# Patient Record
Sex: FEMALE | Race: WHITE | Employment: UNEMPLOYED
[De-identification: names, ages, dates, MRNs, and addresses within clinical notes are randomized per-mention and may not be internally consistent; named-entity substitution may affect disease eponyms.]

---

## 2017-01-31 ENCOUNTER — OFFICE VISIT (OUTPATIENT)
Dept: OBGYN | Facility: CLINIC | Age: 72
End: 2017-01-31

## 2017-01-31 VITALS
RESPIRATION RATE: 18 BRPM | HEIGHT: 64 IN | HEART RATE: 78 BPM | SYSTOLIC BLOOD PRESSURE: 118 MMHG | BODY MASS INDEX: 26.8 KG/M2 | DIASTOLIC BLOOD PRESSURE: 70 MMHG | WEIGHT: 157 LBS

## 2017-01-31 DIAGNOSIS — Z01.419 ENCOUNTER FOR GYNECOLOGICAL EXAMINATION (GENERAL) (ROUTINE) WITHOUT ABNORMAL FINDINGS: Primary | ICD-10-CM

## 2017-01-31 PROCEDURE — 99397 PER PM REEVAL EST PAT 65+ YR: CPT | Performed by: ADVANCED PRACTICE MIDWIFE

## 2017-01-31 ASSESSMENT — PATIENT HEALTH QUESTIONNAIRE - PHQ9
SUM OF ALL RESPONSES TO PHQ9 QUESTIONS 1 & 2: 0
1. LITTLE INTEREST OR PLEASURE IN DOING THINGS: 0
2. FEELING DOWN, DEPRESSED OR HOPELESS: 0
SUM OF ALL RESPONSES TO PHQ QUESTIONS 1-9: 0

## 2017-02-06 ENCOUNTER — OFFICE VISIT (OUTPATIENT)
Dept: INTERNAL MEDICINE | Facility: CLINIC | Age: 72
End: 2017-02-06

## 2017-02-06 VITALS
HEIGHT: 64 IN | DIASTOLIC BLOOD PRESSURE: 70 MMHG | BODY MASS INDEX: 26.8 KG/M2 | WEIGHT: 157 LBS | SYSTOLIC BLOOD PRESSURE: 120 MMHG

## 2017-02-06 DIAGNOSIS — M94.9 DISORDER OF CARTILAGE: ICD-10-CM

## 2017-02-06 DIAGNOSIS — E11.69 TYPE 2 DIABETES MELLITUS WITH OTHER SPECIFIED COMPLICATION (HCC): Primary | ICD-10-CM

## 2017-02-06 DIAGNOSIS — M19.91 PRIMARY OSTEOARTHRITIS, UNSPECIFIED SITE: ICD-10-CM

## 2017-02-06 DIAGNOSIS — J44.1 CHRONIC OBSTRUCTIVE PULMONARY DISEASE WITH ACUTE EXACERBATION (HCC): ICD-10-CM

## 2017-02-06 DIAGNOSIS — I10 ESSENTIAL HYPERTENSION: ICD-10-CM

## 2017-02-06 DIAGNOSIS — K21.9 GASTROESOPHAGEAL REFLUX DISEASE WITHOUT ESOPHAGITIS: ICD-10-CM

## 2017-02-06 PROCEDURE — 99214 OFFICE O/P EST MOD 30 MIN: CPT | Performed by: INTERNAL MEDICINE

## 2017-02-06 RX ORDER — OMEPRAZOLE 20 MG/1
20 CAPSULE, DELAYED RELEASE ORAL DAILY
Qty: 30 CAPSULE | Refills: 3 | Status: SHIPPED | OUTPATIENT
Start: 2017-02-06 | End: 2017-06-08 | Stop reason: SDUPTHER

## 2017-02-06 ASSESSMENT — ENCOUNTER SYMPTOMS
SHORTNESS OF BREATH: 1
BLOOD IN STOOL: 0
RHINORRHEA: 0
EYE ITCHING: 0
VOMITING: 0
SORE THROAT: 0
SINUS PRESSURE: 0
RECTAL PAIN: 0
PHOTOPHOBIA: 0
APNEA: 0
COLOR CHANGE: 0
EYE REDNESS: 0
CONSTIPATION: 0
BACK PAIN: 0
VOICE CHANGE: 0
TROUBLE SWALLOWING: 0
FACIAL SWELLING: 0
NAUSEA: 0
COUGH: 0
DIARRHEA: 0
ANAL BLEEDING: 0
CHEST TIGHTNESS: 0
CHOKING: 0
STRIDOR: 0
ABDOMINAL PAIN: 0
ABDOMINAL DISTENTION: 0
EYE PAIN: 0
WHEEZING: 0
EYE DISCHARGE: 0

## 2017-02-07 ENCOUNTER — TELEPHONE (OUTPATIENT)
Dept: INTERNAL MEDICINE | Facility: CLINIC | Age: 72
End: 2017-02-07

## 2017-02-07 RX ORDER — MULTIVIT-MIN/IRON/FOLIC ACID/K 18-600-40
2000 CAPSULE ORAL DAILY
COMMUNITY
End: 2020-03-11

## 2017-02-08 LAB
BASOPHILS ABSOLUTE: 0 /ΜL
BASOPHILS RELATIVE PERCENT: 0.5 %
CHOLESTEROL, TOTAL: 224 MG/DL
CHOLESTEROL/HDL RATIO: 3.4
CREATININE, URINE: 5.3
EOSINOPHILS ABSOLUTE: 0.1 /ΜL
EOSINOPHILS RELATIVE PERCENT: 2.4 %
HBA1C MFR BLD: 6.3 %
HCT VFR BLD CALC: 39.6 % (ref 36–46)
HDLC SERPL-MCNC: 66 MG/DL (ref 35–70)
HEMOGLOBIN: 13.2 G/DL (ref 12–16)
LDL CHOLESTEROL CALCULATED: 135 MG/DL (ref 0–160)
LYMPHOCYTES ABSOLUTE: 1.3 /ΜL
LYMPHOCYTES RELATIVE PERCENT: 29.6 %
MCH RBC QN AUTO: 29.2 PG
MCHC RBC AUTO-ENTMCNC: 33.4 G/DL
MCV RBC AUTO: 88 FL
MICROALBUMIN/CREAT 24H UR: 0.8 MG/G{CREAT}
MICROALBUMIN/CREAT UR-RTO: 149.74
MONOCYTES ABSOLUTE: 0.4 /ΜL
MONOCYTES RELATIVE PERCENT: 8.9 %
NEUTROPHILS ABSOLUTE: 2.6 /ΜL
NEUTROPHILS RELATIVE PERCENT: 58.6 %
PDW BLD-RTO: 14.1 %
PLATELET # BLD: 280 K/ΜL
PMV BLD AUTO: 7.9 FL
RBC # BLD: 4.53 10^6/ΜL
TRIGL SERPL-MCNC: 117 MG/DL
VITAMIN D 25-HYDROXY: 30.9
VITAMIN D2, 25 HYDROXY: NORMAL
VITAMIN D3,25 HYDROXY: NORMAL
VLDLC SERPL CALC-MCNC: 23 MG/DL
WBC # BLD: 4.5 10^3/ML

## 2017-02-13 ENCOUNTER — TELEPHONE (OUTPATIENT)
Dept: INTERNAL MEDICINE | Facility: CLINIC | Age: 72
End: 2017-02-13

## 2017-02-13 DIAGNOSIS — M94.9 DISORDER OF CARTILAGE: ICD-10-CM

## 2017-02-13 DIAGNOSIS — E11.69 TYPE 2 DIABETES MELLITUS WITH OTHER SPECIFIED COMPLICATION (HCC): ICD-10-CM

## 2017-02-13 DIAGNOSIS — R30.0 DYSURIA: ICD-10-CM

## 2017-02-13 DIAGNOSIS — N90.7 CYST, VULVA: ICD-10-CM

## 2017-02-13 DIAGNOSIS — M19.91 PRIMARY OSTEOARTHRITIS, UNSPECIFIED SITE: ICD-10-CM

## 2017-02-20 ENCOUNTER — TELEPHONE (OUTPATIENT)
Dept: INTERNAL MEDICINE | Facility: CLINIC | Age: 72
End: 2017-02-20

## 2017-03-22 ENCOUNTER — OFFICE VISIT (OUTPATIENT)
Dept: INTERNAL MEDICINE CLINIC | Age: 72
End: 2017-03-22
Payer: COMMERCIAL

## 2017-03-22 VITALS
SYSTOLIC BLOOD PRESSURE: 130 MMHG | BODY MASS INDEX: 26.63 KG/M2 | DIASTOLIC BLOOD PRESSURE: 72 MMHG | WEIGHT: 156 LBS | HEIGHT: 64 IN

## 2017-03-22 DIAGNOSIS — E11.43 TYPE 2 DIABETES MELLITUS WITH DIABETIC AUTONOMIC NEUROPATHY, WITHOUT LONG-TERM CURRENT USE OF INSULIN (HCC): ICD-10-CM

## 2017-03-22 DIAGNOSIS — M54.42 ACUTE MIDLINE LOW BACK PAIN WITH LEFT-SIDED SCIATICA: Primary | ICD-10-CM

## 2017-03-22 DIAGNOSIS — I10 ESSENTIAL HYPERTENSION: ICD-10-CM

## 2017-03-22 PROCEDURE — 99213 OFFICE O/P EST LOW 20 MIN: CPT | Performed by: INTERNAL MEDICINE

## 2017-03-22 RX ORDER — CYCLOBENZAPRINE HCL 5 MG
5 TABLET ORAL NIGHTLY PRN
Qty: 10 TABLET | Refills: 0 | Status: SHIPPED | OUTPATIENT
Start: 2017-03-22 | End: 2017-04-01

## 2017-03-22 RX ORDER — IBUPROFEN 400 MG/1
400 TABLET ORAL EVERY 6 HOURS PRN
Qty: 40 TABLET | Refills: 0 | Status: SHIPPED | OUTPATIENT
Start: 2017-03-22 | End: 2017-12-18

## 2017-03-23 ASSESSMENT — ENCOUNTER SYMPTOMS
COLOR CHANGE: 0
BACK PAIN: 1
BLOOD IN STOOL: 0
CHOKING: 0
CHEST TIGHTNESS: 0
ABDOMINAL PAIN: 0
EYE PAIN: 0
COUGH: 0
ANAL BLEEDING: 0
EYE ITCHING: 0
ABDOMINAL DISTENTION: 0
APNEA: 0
EYE DISCHARGE: 0

## 2017-04-03 ENCOUNTER — OFFICE VISIT (OUTPATIENT)
Dept: INTERNAL MEDICINE CLINIC | Age: 72
End: 2017-04-03
Payer: COMMERCIAL

## 2017-04-03 VITALS
WEIGHT: 157 LBS | BODY MASS INDEX: 26.8 KG/M2 | HEIGHT: 64 IN | HEART RATE: 80 BPM | SYSTOLIC BLOOD PRESSURE: 126 MMHG | DIASTOLIC BLOOD PRESSURE: 76 MMHG

## 2017-04-03 DIAGNOSIS — M70.62 TROCHANTERIC BURSITIS, LEFT HIP: Primary | ICD-10-CM

## 2017-04-03 DIAGNOSIS — M54.42 ACUTE LEFT-SIDED LOW BACK PAIN WITH LEFT-SIDED SCIATICA: ICD-10-CM

## 2017-04-03 PROCEDURE — 20600 DRAIN/INJ JOINT/BURSA W/O US: CPT | Performed by: INTERNAL MEDICINE

## 2017-04-03 PROCEDURE — 99214 OFFICE O/P EST MOD 30 MIN: CPT | Performed by: INTERNAL MEDICINE

## 2017-04-03 RX ORDER — METHYLPREDNISOLONE 4 MG/1
TABLET ORAL
Qty: 1 KIT | Refills: 0 | Status: SHIPPED | OUTPATIENT
Start: 2017-04-03 | End: 2017-04-09

## 2017-04-03 RX ORDER — METHYLPREDNISOLONE ACETATE 80 MG/ML
80 INJECTION, SUSPENSION INTRA-ARTICULAR; INTRALESIONAL; INTRAMUSCULAR; SOFT TISSUE ONCE
Status: COMPLETED | OUTPATIENT
Start: 2017-04-03 | End: 2017-04-03

## 2017-04-03 RX ADMIN — METHYLPREDNISOLONE ACETATE 80 MG: 80 INJECTION, SUSPENSION INTRA-ARTICULAR; INTRALESIONAL; INTRAMUSCULAR; SOFT TISSUE at 10:45

## 2017-04-03 ASSESSMENT — ENCOUNTER SYMPTOMS
CHEST TIGHTNESS: 0
ANAL BLEEDING: 0
APNEA: 0
EYE ITCHING: 0
EYE DISCHARGE: 0
BLOOD IN STOOL: 0
BACK PAIN: 1
ABDOMINAL PAIN: 0
CHOKING: 0
COUGH: 0
EYE PAIN: 0
COLOR CHANGE: 0
ABDOMINAL DISTENTION: 0

## 2017-04-04 ENCOUNTER — TELEPHONE (OUTPATIENT)
Dept: INTERNAL MEDICINE CLINIC | Age: 72
End: 2017-04-04

## 2017-04-13 ENCOUNTER — TELEPHONE (OUTPATIENT)
Dept: INTERNAL MEDICINE CLINIC | Age: 72
End: 2017-04-13

## 2017-04-13 ENCOUNTER — OFFICE VISIT (OUTPATIENT)
Dept: INTERNAL MEDICINE CLINIC | Age: 72
End: 2017-04-13
Payer: COMMERCIAL

## 2017-04-13 VITALS
DIASTOLIC BLOOD PRESSURE: 70 MMHG | BODY MASS INDEX: 26.8 KG/M2 | HEIGHT: 64 IN | WEIGHT: 157 LBS | HEART RATE: 90 BPM | SYSTOLIC BLOOD PRESSURE: 128 MMHG

## 2017-04-13 DIAGNOSIS — M89.9 BONE LESION: ICD-10-CM

## 2017-04-13 DIAGNOSIS — M51.06 DISC DISEASE WITH MYELOPATHY, LUMBAR: Primary | ICD-10-CM

## 2017-04-13 DIAGNOSIS — M51.9 LUMBAR DISC DISEASE: Primary | ICD-10-CM

## 2017-04-13 PROCEDURE — 99213 OFFICE O/P EST LOW 20 MIN: CPT | Performed by: INTERNAL MEDICINE

## 2017-04-13 RX ORDER — METHYLPREDNISOLONE 4 MG/1
TABLET ORAL
COMMUNITY
Start: 2017-04-03 | End: 2017-09-15

## 2017-04-13 ASSESSMENT — ENCOUNTER SYMPTOMS
SHORTNESS OF BREATH: 0
BLOOD IN STOOL: 0
APNEA: 0
CHOKING: 0
CHEST TIGHTNESS: 0
EYE ITCHING: 0
COUGH: 0
ABDOMINAL DISTENTION: 0
ABDOMINAL PAIN: 0
EYE DISCHARGE: 0
EYE PAIN: 0
BACK PAIN: 1

## 2017-04-18 ENCOUNTER — OFFICE VISIT (OUTPATIENT)
Dept: ORTHOPEDIC SURGERY | Age: 72
End: 2017-04-18
Payer: COMMERCIAL

## 2017-04-18 VITALS — HEIGHT: 64 IN

## 2017-04-18 DIAGNOSIS — M51.36 DDD (DEGENERATIVE DISC DISEASE), LUMBAR: ICD-10-CM

## 2017-04-18 DIAGNOSIS — M48.061 LUMBAR STENOSIS: ICD-10-CM

## 2017-04-18 DIAGNOSIS — M54.50 CHRONIC MIDLINE LOW BACK PAIN WITHOUT SCIATICA: Primary | ICD-10-CM

## 2017-04-18 DIAGNOSIS — G89.29 CHRONIC MIDLINE LOW BACK PAIN WITHOUT SCIATICA: Primary | ICD-10-CM

## 2017-04-18 PROCEDURE — 99203 OFFICE O/P NEW LOW 30 MIN: CPT | Performed by: ORTHOPAEDIC SURGERY

## 2017-04-25 ASSESSMENT — ENCOUNTER SYMPTOMS: BACK PAIN: 1

## 2017-05-09 ENCOUNTER — OFFICE VISIT (OUTPATIENT)
Dept: ORTHOPEDIC SURGERY | Age: 72
End: 2017-05-09
Payer: COMMERCIAL

## 2017-05-09 DIAGNOSIS — M51.06 DISC DISEASE WITH MYELOPATHY, LUMBAR: ICD-10-CM

## 2017-05-09 DIAGNOSIS — M51.36 DDD (DEGENERATIVE DISC DISEASE), LUMBAR: ICD-10-CM

## 2017-05-09 DIAGNOSIS — M48.061 LUMBAR STENOSIS: Primary | ICD-10-CM

## 2017-05-09 PROCEDURE — 99213 OFFICE O/P EST LOW 20 MIN: CPT | Performed by: ORTHOPAEDIC SURGERY

## 2017-08-28 ENCOUNTER — OFFICE VISIT (OUTPATIENT)
Dept: INTERNAL MEDICINE CLINIC | Age: 72
End: 2017-08-28
Payer: COMMERCIAL

## 2017-08-28 VITALS
BODY MASS INDEX: 26.46 KG/M2 | SYSTOLIC BLOOD PRESSURE: 120 MMHG | WEIGHT: 155 LBS | HEIGHT: 64 IN | DIASTOLIC BLOOD PRESSURE: 70 MMHG

## 2017-08-28 DIAGNOSIS — J44.1 CHRONIC OBSTRUCTIVE PULMONARY DISEASE WITH ACUTE EXACERBATION (HCC): ICD-10-CM

## 2017-08-28 DIAGNOSIS — E11.49 TYPE 2 DIABETES MELLITUS WITH OTHER NEUROLOGIC COMPLICATION, UNSPECIFIED LONG TERM INSULIN USE STATUS: ICD-10-CM

## 2017-08-28 DIAGNOSIS — M48.061 LUMBAR STENOSIS: Primary | ICD-10-CM

## 2017-08-28 DIAGNOSIS — I10 ESSENTIAL HYPERTENSION: ICD-10-CM

## 2017-08-28 PROCEDURE — 99214 OFFICE O/P EST MOD 30 MIN: CPT | Performed by: INTERNAL MEDICINE

## 2017-08-28 RX ORDER — MELOXICAM 15 MG/1
15 TABLET ORAL DAILY
Qty: 30 TABLET | Refills: 3 | Status: SHIPPED | OUTPATIENT
Start: 2017-08-28 | End: 2018-03-20

## 2017-08-28 RX ORDER — METHYLPREDNISOLONE 4 MG/1
TABLET ORAL
Qty: 1 KIT | Refills: 0 | Status: SHIPPED | OUTPATIENT
Start: 2017-08-28 | End: 2017-09-03

## 2017-08-28 RX ORDER — SENNOSIDES 8.6 MG
650 CAPSULE ORAL EVERY 8 HOURS PRN
COMMUNITY
End: 2017-12-18

## 2017-08-28 RX ORDER — TIZANIDINE 4 MG/1
4 TABLET ORAL 3 TIMES DAILY
Qty: 30 TABLET | Refills: 2 | Status: SHIPPED | OUTPATIENT
Start: 2017-08-28 | End: 2017-12-18

## 2017-08-28 ASSESSMENT — ENCOUNTER SYMPTOMS
EYE PAIN: 0
FACIAL SWELLING: 0
SORE THROAT: 0
PHOTOPHOBIA: 0
APNEA: 0
VOICE CHANGE: 0
SHORTNESS OF BREATH: 1
CONSTIPATION: 0
NAUSEA: 0
SINUS PRESSURE: 0
BLOOD IN STOOL: 0
ABDOMINAL DISTENTION: 0
BACK PAIN: 1
ABDOMINAL PAIN: 0
COUGH: 0
VOMITING: 0
CHOKING: 0
STRIDOR: 0
COLOR CHANGE: 0
EYE DISCHARGE: 0
TROUBLE SWALLOWING: 0
EYE REDNESS: 0
CHEST TIGHTNESS: 0
RECTAL PAIN: 0
RHINORRHEA: 0
EYE ITCHING: 0
WHEEZING: 0
DIARRHEA: 0
ANAL BLEEDING: 0

## 2017-09-15 ENCOUNTER — OFFICE VISIT (OUTPATIENT)
Dept: INTERNAL MEDICINE CLINIC | Age: 72
End: 2017-09-15
Payer: COMMERCIAL

## 2017-09-15 VITALS
SYSTOLIC BLOOD PRESSURE: 138 MMHG | DIASTOLIC BLOOD PRESSURE: 80 MMHG | HEIGHT: 64 IN | WEIGHT: 156 LBS | BODY MASS INDEX: 26.63 KG/M2

## 2017-09-15 DIAGNOSIS — E11.43 TYPE 2 DIABETES MELLITUS WITH DIABETIC AUTONOMIC NEUROPATHY, WITHOUT LONG-TERM CURRENT USE OF INSULIN (HCC): ICD-10-CM

## 2017-09-15 DIAGNOSIS — M48.061 LUMBAR STENOSIS: ICD-10-CM

## 2017-09-15 DIAGNOSIS — R55 SYNCOPE, UNSPECIFIED SYNCOPE TYPE: Primary | ICD-10-CM

## 2017-09-15 DIAGNOSIS — E55.9 VITAMIN D DEFICIENCY: ICD-10-CM

## 2017-09-15 DIAGNOSIS — J44.1 CHRONIC OBSTRUCTIVE PULMONARY DISEASE WITH ACUTE EXACERBATION (HCC): ICD-10-CM

## 2017-09-15 DIAGNOSIS — I10 ESSENTIAL HYPERTENSION: ICD-10-CM

## 2017-09-15 PROCEDURE — 99214 OFFICE O/P EST MOD 30 MIN: CPT | Performed by: INTERNAL MEDICINE

## 2017-09-15 ASSESSMENT — ENCOUNTER SYMPTOMS
VOMITING: 0
SORE THROAT: 0
FACIAL SWELLING: 0
CHOKING: 0
VOICE CHANGE: 0
ANAL BLEEDING: 0
RECTAL PAIN: 0
EYE DISCHARGE: 0
PHOTOPHOBIA: 0
EYE ITCHING: 0
RHINORRHEA: 0
TROUBLE SWALLOWING: 0
EYE REDNESS: 0
NAUSEA: 0
ABDOMINAL PAIN: 1
DIARRHEA: 0
CHEST TIGHTNESS: 0
BACK PAIN: 1
SINUS PRESSURE: 0
BLOOD IN STOOL: 0
SHORTNESS OF BREATH: 1
EYE PAIN: 0
COUGH: 0
COLOR CHANGE: 0
CONSTIPATION: 0
STRIDOR: 0
APNEA: 0
WHEEZING: 0
ABDOMINAL DISTENTION: 0

## 2017-09-26 LAB
ALBUMIN SERPL-MCNC: 4.3 G/DL
ALP BLD-CCNC: 61 U/L
ALT SERPL-CCNC: 19 U/L
ANION GAP SERPL CALCULATED.3IONS-SCNC: 8 MMOL/L
AST SERPL-CCNC: 16 U/L
AVERAGE GLUCOSE: 148
BASOPHILS ABSOLUTE: 0 /ΜL
BASOPHILS RELATIVE PERCENT: 0.6 %
BILIRUB SERPL-MCNC: 0.5 MG/DL (ref 0.1–1.4)
BILIRUBIN, URINE: NEGATIVE
BLOOD, URINE: NEGATIVE
BUN BLDV-MCNC: 18 MG/DL
CALCIUM SERPL-MCNC: 9.4 MG/DL
CHLORIDE BLD-SCNC: 104 MMOL/L
CHOLESTEROL, TOTAL: 251 MG/DL
CHOLESTEROL/HDL RATIO: 3.7
CLARITY: CLEAR
CO2: 31 MMOL/L
COLOR: YELLOW
CREAT SERPL-MCNC: 0.84 MG/DL
CREATININE URINE: 202.6 MG/DL
EOSINOPHILS ABSOLUTE: 0.1 /ΜL
EOSINOPHILS RELATIVE PERCENT: 2.1 %
GFR CALCULATED: >60
GLUCOSE BLD-MCNC: 132 MG/DL
GLUCOSE URINE: NEGATIVE
HBA1C MFR BLD: 6.8 %
HCT VFR BLD CALC: 39.7 % (ref 36–46)
HDLC SERPL-MCNC: 67 MG/DL (ref 35–70)
HEMOGLOBIN: 13.5 G/DL (ref 12–16)
KETONES, URINE: NEGATIVE
LDL CHOLESTEROL CALCULATED: 158 MG/DL (ref 0–160)
LEUKOCYTE ESTERASE, URINE: NEGATIVE
LYMPHOCYTES ABSOLUTE: 1 /ΜL
LYMPHOCYTES RELATIVE PERCENT: 21 %
MCH RBC QN AUTO: 29.9 PG
MCHC RBC AUTO-ENTMCNC: 33.9 G/DL
MCV RBC AUTO: 88 FL
MICROALBUMIN/CREAT 24H UR: 1 MG/G{CREAT}
MONOCYTES ABSOLUTE: 0.4 /ΜL
MONOCYTES RELATIVE PERCENT: 9.1 %
NEUTROPHILS ABSOLUTE: 3.3 /ΜL
NEUTROPHILS RELATIVE PERCENT: 67.2 %
NITRITE, URINE: NEGATIVE
PDW BLD-RTO: 14.1 %
PH UA: 6 (ref 4.5–8)
PLATELET # BLD: 258 K/ΜL
PMV BLD AUTO: 7.8 FL
POTASSIUM SERPL-SCNC: 3.6 MMOL/L
PROTEIN UA: NEGATIVE
RBC # BLD: 4.51 10^6/ΜL
SODIUM BLD-SCNC: 143 MMOL/L
SPECIFIC GRAVITY, URINE: 1.02
TOTAL PROTEIN: 7.1
TRIGL SERPL-MCNC: 132 MG/DL
UROBILINOGEN, URINE: NORMAL
VITAMIN D 25-HYDROXY: NORMAL
VITAMIN D2, 25 HYDROXY: NORMAL
VITAMIN D3,25 HYDROXY: 41.1
VLDLC SERPL CALC-MCNC: 26 MG/DL
WBC # BLD: 4.9 10^3/ML

## 2017-10-03 ENCOUNTER — TELEPHONE (OUTPATIENT)
Dept: INTERNAL MEDICINE CLINIC | Age: 72
End: 2017-10-03

## 2017-10-04 DIAGNOSIS — I10 ESSENTIAL HYPERTENSION: ICD-10-CM

## 2017-10-04 DIAGNOSIS — E11.43 TYPE 2 DIABETES MELLITUS WITH DIABETIC AUTONOMIC NEUROPATHY, WITHOUT LONG-TERM CURRENT USE OF INSULIN (HCC): ICD-10-CM

## 2017-10-04 DIAGNOSIS — E55.9 VITAMIN D DEFICIENCY: ICD-10-CM

## 2017-11-28 RX ORDER — OMEPRAZOLE 20 MG/1
CAPSULE, DELAYED RELEASE ORAL
Qty: 30 CAPSULE | Refills: 3 | Status: SHIPPED | OUTPATIENT
Start: 2017-11-28 | End: 2018-06-19 | Stop reason: SDUPTHER

## 2017-12-18 ENCOUNTER — OFFICE VISIT (OUTPATIENT)
Dept: INTERNAL MEDICINE CLINIC | Age: 72
End: 2017-12-18
Payer: COMMERCIAL

## 2017-12-18 VITALS
WEIGHT: 151 LBS | DIASTOLIC BLOOD PRESSURE: 80 MMHG | BODY MASS INDEX: 25.78 KG/M2 | HEIGHT: 64 IN | SYSTOLIC BLOOD PRESSURE: 120 MMHG

## 2017-12-18 DIAGNOSIS — I10 ESSENTIAL HYPERTENSION: Primary | ICD-10-CM

## 2017-12-18 DIAGNOSIS — J44.1 CHRONIC OBSTRUCTIVE PULMONARY DISEASE WITH ACUTE EXACERBATION (HCC): ICD-10-CM

## 2017-12-18 DIAGNOSIS — E11.59 TYPE 2 DIABETES MELLITUS WITH OTHER CIRCULATORY COMPLICATION, UNSPECIFIED LONG TERM INSULIN USE STATUS: ICD-10-CM

## 2017-12-18 DIAGNOSIS — I70.1 RAS (RENAL ARTERY STENOSIS) (HCC): ICD-10-CM

## 2017-12-18 PROCEDURE — 99214 OFFICE O/P EST MOD 30 MIN: CPT | Performed by: INTERNAL MEDICINE

## 2017-12-18 RX ORDER — METHYLPREDNISOLONE 4 MG/1
TABLET ORAL
Qty: 1 KIT | Refills: 0 | Status: SHIPPED | OUTPATIENT
Start: 2017-12-18 | End: 2017-12-24

## 2017-12-18 RX ORDER — LIDOCAINE 50 MG/G
1 PATCH TOPICAL DAILY
Qty: 30 PATCH | Refills: 0 | Status: SHIPPED | OUTPATIENT
Start: 2017-12-18 | End: 2018-05-10

## 2017-12-18 RX ORDER — COVID-19 ANTIGEN TEST
KIT MISCELLANEOUS
COMMUNITY
End: 2017-12-18

## 2017-12-18 RX ORDER — ACETAMINOPHEN AND CODEINE PHOSPHATE 300; 30 MG/1; MG/1
1-2 TABLET ORAL EVERY 6 HOURS PRN
Qty: 50 TABLET | Refills: 1 | Status: SHIPPED | OUTPATIENT
Start: 2017-12-18 | End: 2018-02-05 | Stop reason: SDUPTHER

## 2017-12-18 ASSESSMENT — ENCOUNTER SYMPTOMS
EYE PAIN: 0
EYE ITCHING: 0
BACK PAIN: 1
DIARRHEA: 0
FACIAL SWELLING: 0
CONSTIPATION: 0
STRIDOR: 0
COLOR CHANGE: 0
NAUSEA: 0
SHORTNESS OF BREATH: 1
ABDOMINAL DISTENTION: 0
ANAL BLEEDING: 0
EYE DISCHARGE: 0
VOICE CHANGE: 0
EYE REDNESS: 0
CHOKING: 0
PHOTOPHOBIA: 0
BLOOD IN STOOL: 0
RECTAL PAIN: 0
COUGH: 0
TROUBLE SWALLOWING: 0
APNEA: 0
VOMITING: 0
WHEEZING: 0
SINUS PRESSURE: 0
SORE THROAT: 0
ABDOMINAL PAIN: 0
CHEST TIGHTNESS: 0
RHINORRHEA: 0

## 2017-12-18 NOTE — PROGRESS NOTES
Chronic Disease Visit Information    BP Readings from Last 3 Encounters:   09/15/17 138/80   08/28/17 120/70   04/13/17 128/70          Hemoglobin A1C (%)   Date Value   09/26/2017 6.8   02/08/2017 6.3   09/15/2016 6.3     Microalb/Crt. Ratio (mcg/mg creat)   Date Value   06/24/2014 7     LDL Cholesterol (mg/dL)   Date Value   06/24/2014 80     LDL Calculated (mg/dL)   Date Value   09/26/2017 158     HDL (mg/dL)   Date Value   09/26/2017 67     BUN (mg/dL)   Date Value   09/26/2017 18     CREATININE (no units)   Date Value   09/26/2017 0.84     Glucose (mg/dL)   Date Value   09/26/2017 132            Have you changed or started any medications since your last visit including any over-the-counter medicines, vitamins, or herbal medicines? no   Are you having any side effects from any of your medications? -  no  Have you stopped taking any of your medications? Is so, why? -  no    Have you seen any other physician or provider since your last visit? No  Have you had any other diagnostic tests since your last visit? No  Have you been seen in the emergency room and/or had an admission to a hospital since we last saw you? No  Have you had your annual diabetic retinal (eye) exam? No  Have you had your routine dental cleaning in the past 6 months? no    Have you activated your [employer] account? If not, what are your barriers?  No: declined     Patient Care Team:  Cristela Carrero MD as PCP - General (Internal Medicine)  Jonas Bill DO as Consulting Physician (Obstetrics & Gynecology)         Medical History Review  Past Medical, Family, and Social History reviewed and does contribute to the patient presenting condition  Chief Complaint   Patient presents with    Back Pain     follow up     Diabetes     6.8%    COPD     management      Health Maintenance   Topic Date Due    DTaP/Tdap/Td vaccine (1 - Tdap) 02/17/1963    Diabetic foot exam  09/15/2018    Diabetic hemoglobin A1C test  09/26/2018    Diabetic microalbuminuria test  09/26/2018    Lipid screen  09/26/2018    Diabetic retinal exam  11/28/2018    Breast cancer screen  01/23/2019    Colon cancer screen colonoscopy  04/27/2025    Zostavax vaccine  Addressed    DEXA (modify frequency per FRAX score)  Completed    Flu vaccine  Completed    Pneumococcal low/med risk  Completed

## 2017-12-18 NOTE — PROGRESS NOTES
 Drug use: No    Sexual activity: Not Currently     Partners: Male     Birth control/ protection: Post-menopausal     Other Topics Concern    None     Social History Narrative    None       Current Outpatient Prescriptions   Medication Sig Dispense Refill    Naproxen Sodium (ALEVE) 220 MG CAPS Take by mouth      acetaminophen-codeine (TYLENOL #3) 300-30 MG per tablet Take 1-2 tablets by mouth every 6 hours as needed for Pain . 50 tablet 1    lidocaine (LIDODERM) 5 % Place 1 patch onto the skin daily 12 hours on, 12 hours off. 30 patch 0    methylPREDNISolone (MEDROL, APOLONIA,) 4 MG tablet Take as directed 1 kit 0    omeprazole (PRILOSEC) 20 MG delayed release capsule TAKE 1 CAPSULE BY MOUTH DAILY 30 capsule 3    losartan (COZAAR) 100 MG tablet TAKE 1 TABLET EVERY DAY 90 tablet 3    meloxicam (MOBIC) 15 MG tablet Take 1 tablet by mouth daily 30 tablet 3    amLODIPine (NORVASC) 10 MG tablet TAKE 1 TABLET BY MOUTH DAILY 90 tablet 3    glimepiride (AMARYL) 2 MG tablet TAKE 1 TABLET EVERY MORNING BEFORE BREAKFAST 90 tablet 2    SYMBICORT 160-4.5 MCG/ACT AERO INHALE 2 PUFFS TWICE DAILY 30.6 Inhaler 5    Cholecalciferol (VITAMIN D) 2000 UNITS CAPS capsule Take 2,000 Units by mouth daily      vitamin E 400 UNIT capsule Take 400 Units by mouth daily      methylcellulose (CITRUCEL) 500 MG TABS Take by mouth      butalbital-acetaminophen-caffeine (FIORICET, ESGIC) per tablet       CALCIUM CITRATE PO Take  by mouth daily. No current facility-administered medications for this visit. Review of Systems:    Review of Systems   Constitutional: Negative for activity change, appetite change, chills, diaphoresis, fatigue and fever. HENT: Negative for congestion, dental problem, drooling, ear discharge, ear pain, facial swelling, hearing loss, mouth sores, nosebleeds, postnasal drip, rhinorrhea, sinus pressure, sneezing, sore throat, tinnitus, trouble swallowing and voice change.     Eyes: Negative for photophobia, pain, discharge, redness, itching and visual disturbance. Respiratory: Positive for shortness of breath. Negative for apnea, cough, choking, chest tightness, wheezing and stridor. Cardiovascular: Negative for chest pain, palpitations and leg swelling. Gastrointestinal: Negative for abdominal distention, abdominal pain, anal bleeding, blood in stool, constipation, diarrhea, nausea, rectal pain and vomiting. Endocrine: Negative for cold intolerance, heat intolerance, polydipsia, polyphagia and polyuria. Genitourinary: Negative for decreased urine volume, difficulty urinating, dyspareunia, dysuria, enuresis, flank pain, frequency, genital sores, hematuria, menstrual problem, pelvic pain, urgency, vaginal bleeding and vaginal discharge. Musculoskeletal: Positive for arthralgias and back pain. Negative for gait problem, joint swelling, myalgias, neck pain and neck stiffness. Skin: Negative for color change, pallor, rash and wound. Neurological: Negative for dizziness, tremors, seizures, syncope, facial asymmetry, speech difficulty, weakness, light-headedness, numbness and headaches. Hematological: Negative for adenopathy. Does not bruise/bleed easily. Psychiatric/Behavioral: Positive for sleep disturbance. Negative for agitation, behavioral problems, confusion, decreased concentration and dysphoric mood. The patient is nervous/anxious. Objective:     Physical Exam:      Physical Exam   Constitutional: She is oriented to person, place, and time. She appears well-developed and well-nourished. No distress. HENT:   Head: Normocephalic and atraumatic. Right Ear: External ear normal.   Left Ear: External ear normal.   Nose: Nose normal.   Mouth/Throat: Oropharynx is clear and moist. No oropharyngeal exudate. Eyes: Conjunctivae and EOM are normal. Pupils are equal, round, and reactive to light. Right eye exhibits no discharge. Left eye exhibits no discharge. No scleral icterus. Neck: Normal range of motion. Neck supple. No JVD present. No tracheal deviation present. No thyromegaly present. Cardiovascular: Normal rate, regular rhythm, normal heart sounds and intact distal pulses. Exam reveals no gallop and no friction rub. No murmur heard. Pulmonary/Chest: Effort normal. No respiratory distress. She has decreased breath sounds. She has no wheezes. She has no rales. She exhibits no tenderness. Abdominal: Soft. Bowel sounds are normal. She exhibits no distension and no mass. There is no tenderness. There is no rebound and no guarding. Genitourinary: Rectal exam shows guaiac negative stool. No vaginal discharge found. Musculoskeletal: She exhibits no edema. Lumbar back: She exhibits decreased range of motion, tenderness and pain. Lymphadenopathy:     She has no cervical adenopathy. Neurological: She is alert and oriented to person, place, and time. She has normal reflexes. No cranial nerve deficit. She exhibits normal muscle tone. Coordination normal.   Skin: Skin is warm and dry. No rash noted. She is not diaphoretic. No erythema. No pallor. Psychiatric: Her behavior is normal. Judgment and thought content normal. Her mood appears anxious.          Results for orders placed or performed in visit on 10/04/17   Hemoglobin A1C   Result Value Ref Range    Hemoglobin A1C 6.8 %    AVERAGE GLUCOSE 148    Comprehensive Metabolic Panel   Result Value Ref Range    Sodium 143 mmol/L    Chloride 104 mmol/L    Potassium 3.6 mmol/L    BUN 18 mg/dL    CREATININE 0.84     Glucose 132 mg/dL    AST 16 U/L    ALT 19 U/L    Calcium 9.4 mg/dL    Total Protein 7.1     CO2 31 mmol/L    Alb 4.3     Alkaline Phosphatase 61 U/L    Total Bilirubin 0.5 0.1 - 1.4 mg/dL    Gfr Calculated >60     Anion Gap 8 mmol/L   CBC Auto Differential   Result Value Ref Range    WBC 4.9 10^3/mL    RBC 4.51 10^6/µL    Hemoglobin 13.5 12.0 - 16.0 g/dL    Hematocrit 39.7 36 - 46 %    MCV 88 fL    MCH 29.9 pg Refill:  0    methylPREDNISolone (MEDROL, APOLONIA,) 4 MG tablet     Sig: Take as directed     Dispense:  1 kit     Refill:  0     D/c naproxen    f/u ortho         Lambert Gianlucaslimchandrika received counseling on the following healthy behaviors: medication adherence  Reviewed prior labs and health maintenance. Continue current medications, diet and exercise. Discussed use, benefit, and side effects of prescribed medications. Barriers to medication compliance addressed. Patient given educational materials - see patient instructions. All patient questions answered. Patient voiced understanding. 1.  Patient given educational materials - see patient instructions  2. Discussed use, benefit, and side effects of prescribed medications. Barriers to medication compliance addressed. All patient questions answered. Pt voiced understanding. 3.  Reviewed prior labs and health maintenance  4. Continue current medications, diet and exercise.   5. 4 weeks

## 2018-01-17 ENCOUNTER — TELEPHONE (OUTPATIENT)
Dept: INTERNAL MEDICINE CLINIC | Age: 73
End: 2018-01-17

## 2018-01-17 DIAGNOSIS — Z12.39 ENCOUNTER FOR BREAST CANCER SCREENING OTHER THAN MAMMOGRAM: Primary | ICD-10-CM

## 2018-01-17 NOTE — TELEPHONE ENCOUNTER
Patient due for thom, would like order sent to [facility]. I faxed this to [facility]    Patient also going to have back surgery with Dr Moisés Velasquez.   We will be receiving paperwork

## 2018-01-26 DIAGNOSIS — Z12.39 BREAST CANCER SCREENING, HIGH RISK PATIENT: Primary | ICD-10-CM

## 2018-02-05 ENCOUNTER — OFFICE VISIT (OUTPATIENT)
Dept: INTERNAL MEDICINE CLINIC | Age: 73
End: 2018-02-05
Payer: COMMERCIAL

## 2018-02-05 VITALS
WEIGHT: 147 LBS | HEIGHT: 64 IN | SYSTOLIC BLOOD PRESSURE: 108 MMHG | DIASTOLIC BLOOD PRESSURE: 62 MMHG | HEART RATE: 67 BPM | BODY MASS INDEX: 25.1 KG/M2

## 2018-02-05 DIAGNOSIS — G89.29 CHRONIC BILATERAL LOW BACK PAIN WITH LEFT-SIDED SCIATICA: Primary | ICD-10-CM

## 2018-02-05 DIAGNOSIS — M54.42 CHRONIC BILATERAL LOW BACK PAIN WITH LEFT-SIDED SCIATICA: Primary | ICD-10-CM

## 2018-02-05 DIAGNOSIS — K59.03 DRUG-INDUCED CONSTIPATION: ICD-10-CM

## 2018-02-05 DIAGNOSIS — E11.43 TYPE 2 DIABETES MELLITUS WITH DIABETIC AUTONOMIC NEUROPATHY, WITHOUT LONG-TERM CURRENT USE OF INSULIN (HCC): ICD-10-CM

## 2018-02-05 DIAGNOSIS — I10 ESSENTIAL HYPERTENSION: Primary | ICD-10-CM

## 2018-02-05 DIAGNOSIS — M51.06 DISC DISEASE WITH MYELOPATHY, LUMBAR: ICD-10-CM

## 2018-02-05 DIAGNOSIS — J44.1 CHRONIC OBSTRUCTIVE PULMONARY DISEASE WITH ACUTE EXACERBATION (HCC): ICD-10-CM

## 2018-02-05 PROCEDURE — 99214 OFFICE O/P EST MOD 30 MIN: CPT | Performed by: INTERNAL MEDICINE

## 2018-02-05 ASSESSMENT — ENCOUNTER SYMPTOMS
ABDOMINAL PAIN: 0
EYE REDNESS: 0
PHOTOPHOBIA: 0
EYE ITCHING: 0
VOICE CHANGE: 0
ANAL BLEEDING: 0
EYE PAIN: 0
EYE DISCHARGE: 0
SINUS PRESSURE: 0
CHEST TIGHTNESS: 0
COLOR CHANGE: 0
APNEA: 0
SHORTNESS OF BREATH: 0
WHEEZING: 0
COUGH: 0
CHOKING: 0
CONSTIPATION: 1
VOMITING: 0
DIARRHEA: 0
STRIDOR: 0
NAUSEA: 0
TROUBLE SWALLOWING: 0
ABDOMINAL DISTENTION: 0
RECTAL PAIN: 0
BACK PAIN: 1
SORE THROAT: 0
BLOOD IN STOOL: 0
RHINORRHEA: 0
FACIAL SWELLING: 0

## 2018-02-05 ASSESSMENT — PATIENT HEALTH QUESTIONNAIRE - PHQ9
SUM OF ALL RESPONSES TO PHQ9 QUESTIONS 1 & 2: 2
SUM OF ALL RESPONSES TO PHQ QUESTIONS 1-9: 2
2. FEELING DOWN, DEPRESSED OR HOPELESS: 1
1. LITTLE INTEREST OR PLEASURE IN DOING THINGS: 1

## 2018-02-05 NOTE — PROGRESS NOTES
icterus. Neck: Normal range of motion. Neck supple. No JVD present. No tracheal deviation present. No thyromegaly present. Cardiovascular: Normal rate, regular rhythm, normal heart sounds and intact distal pulses. Exam reveals no gallop and no friction rub. No murmur heard. Pulmonary/Chest: Effort normal and breath sounds normal. No respiratory distress. She has no wheezes. She has no rales. She exhibits no tenderness. Abdominal: Soft. Bowel sounds are normal. She exhibits no distension and no mass. There is tenderness. There is no rebound and no guarding. Genitourinary: Rectal exam shows guaiac negative stool. No vaginal discharge found. Musculoskeletal: She exhibits tenderness. She exhibits no edema. Lymphadenopathy:     She has no cervical adenopathy. Neurological: She is alert and oriented to person, place, and time. She has normal reflexes. No cranial nerve deficit. She exhibits normal muscle tone. Coordination normal.   Skin: Skin is warm and dry. No rash noted. She is not diaphoretic. No erythema. No pallor. Psychiatric: She has a normal mood and affect.  Her behavior is normal. Judgment and thought content normal.         Results for orders placed or performed in visit on 10/04/17   Hemoglobin A1C   Result Value Ref Range    Hemoglobin A1C 6.8 %    AVERAGE GLUCOSE 148    Comprehensive Metabolic Panel   Result Value Ref Range    Sodium 143 mmol/L    Chloride 104 mmol/L    Potassium 3.6 mmol/L    BUN 18 mg/dL    CREATININE 0.84     Glucose 132 mg/dL    AST 16 U/L    ALT 19 U/L    Calcium 9.4 mg/dL    Total Protein 7.1     CO2 31 mmol/L    Alb 4.3     Alkaline Phosphatase 61 U/L    Total Bilirubin 0.5 0.1 - 1.4 mg/dL    Gfr Calculated >60     Anion Gap 8 mmol/L   CBC Auto Differential   Result Value Ref Range    WBC 4.9 10^3/mL    RBC 4.51 10^6/µL    Hemoglobin 13.5 12.0 - 16.0 g/dL    Hematocrit 39.7 36 - 46 %    MCV 88 fL    MCH 29.9 pg    MCHC 33.9 g/dL    Platelets 923 K/µL    RDW 14.1 % MPV 7.8 fL    Neutrophils % 67.2 %    Lymphocytes % 21.0 %    Monocytes % 9.1 %    Eosinophils % 2.1 %    Basophils % 0.6 %    Neutrophils # 3.3 /µL    Lymphocytes # 1.0 /µL    Monocytes # 0.4 /µL    Eosinophils # 0.1 /µL    Basophils # 0.0 /µL   Lipid Panel   Result Value Ref Range    Cholesterol, Total 251 mg/dL    HDL 67 35 - 70 mg/dL    LDL Calculated 158 0 - 160 mg/dL    Triglycerides 132 mg/dL    Chol/HDL Ratio 3.7     VLDL 26 mg/dL   Urinalysis   Result Value Ref Range    pH, UA 6.0 4.5 - 8.0    Clarity, UA Clear Clear    Protein, UA Negative Negative    Glucose, Ur Negative     Ketones, Urine Negative     Bilirubin, Urine Negative     Urobilinogen, Urine Normal     Nitrite, Urine Negative     Specific Gravity, Urine 1.023     Color, UA Yellow     Leukocyte Esterase, Urine Negative     Blood, Urine Negative    Microalbumin, Ur   Result Value Ref Range    Microalb, Ur 1.0     Creatinine, Ur 202.60 mg/dL   Vitamin D 25 Hydroxy   Result Value Ref Range    Vit D, 25-Hydroxy      Vitamin D3,25 Hydroxy 41.1     Vitamin D2, 25 Hydroxy       No results found. Assessment:     1. Essential hypertension   controlled cr nl no cp   2. Chronic obstructive pulmonary disease with acute exacerbation (HCC)   bvaseline  Cont same    3. Drug-induced constipation   on dulculax. Senna, tea,  Add prn linzess as going to be on pain meds    4. Type 2 diabetes mellitus with diabetic autonomic neuropathy, without long-term current use of insulin ([address]   at goal     5. Disc disease with myelopathy, lumbar   ok to proceed with surgery         Plan:     linzess 72 mg      Cont all same meds     Mamm nl     po fluids       Racquel Gracia received counseling on the following healthy behaviors: medication adherence  Reviewed prior labs and health maintenance. Continue current medications, diet and exercise. Discussed use, benefit, and side effects of prescribed medications. Barriers to medication compliance addressed.    Patient given

## 2018-02-05 NOTE — PROGRESS NOTES
Chronic Disease Visit Information    BP Readings from Last 3 Encounters:   12/18/17 120/80   09/15/17 138/80   08/28/17 120/70          Hemoglobin A1C (%)   Date Value   09/26/2017 6.8   02/08/2017 6.3   09/15/2016 6.3     Microalb/Crt. Ratio (mcg/mg creat)   Date Value   06/24/2014 7     LDL Cholesterol (mg/dL)   Date Value   06/24/2014 80     LDL Calculated (mg/dL)   Date Value   09/26/2017 158     HDL (mg/dL)   Date Value   09/26/2017 67     BUN (mg/dL)   Date Value   09/26/2017 18     CREATININE (no units)   Date Value   09/26/2017 0.84     Glucose (mg/dL)   Date Value   09/26/2017 132            Have you changed or started any medications since your last visit including any over-the-counter medicines, vitamins, or herbal medicines? no   Are you having any side effects from any of your medications? -  no  Have you stopped taking any of your medications? Is so, why? -  no    Have you seen any other physician or provider since your last visit? Yes - Records Obtained  Have you had any other diagnostic tests since your last visit? Yes - Records Obtained  Have you been seen in the emergency room and/or had an admission to a hospital since we last saw you? No  Have you had your annual diabetic retinal (eye) exam? No  Have you had your routine dental cleaning in the past 6 months? no    Have you activated your [employer] account? If not, what are your barriers?  No: declined     Patient Care Team:  Sara Barker MD as PCP - General (Internal Medicine)  Ijeoma Salcedo DO as Consulting Physician (Obstetrics & Gynecology)         Medical History Review  Past Medical, Family, and Social History reviewed and does contribute to the patient presenting condition  Chief Complaint   Patient presents with    Back Pain     scheduled for back surgery already cleared     Hypertension     management     COPD     baseline     Constipation     due to T#3 use        Health Maintenance   Topic Date Due    DTaP/Tdap/Td vaccine (1 - Tdap) 02/17/1963    Diabetic foot exam  09/15/2018    A1C test (Diabetic or Prediabetic)  09/26/2018    Diabetic microalbuminuria test  09/26/2018    Lipid screen  09/26/2018    Potassium monitoring  09/26/2018    Creatinine monitoring  09/26/2018    Diabetic retinal exam  11/28/2018    Breast cancer screen  01/23/2019    Colon cancer screen colonoscopy  04/27/2025    Zostavax vaccine  Addressed    DEXA (modify frequency per FRAX score)  Completed    Flu vaccine  Completed    Pneumococcal low/med risk  Completed

## 2018-02-06 RX ORDER — ACETAMINOPHEN AND CODEINE PHOSPHATE 300; 30 MG/1; MG/1
TABLET ORAL
Qty: 50 TABLET | Refills: 0 | Status: SHIPPED | OUTPATIENT
Start: 2018-02-06 | End: 2018-02-23 | Stop reason: SDUPTHER

## 2018-02-23 DIAGNOSIS — G89.29 CHRONIC BILATERAL LOW BACK PAIN WITH LEFT-SIDED SCIATICA: ICD-10-CM

## 2018-02-23 DIAGNOSIS — M54.42 CHRONIC BILATERAL LOW BACK PAIN WITH LEFT-SIDED SCIATICA: ICD-10-CM

## 2018-02-23 RX ORDER — ACETAMINOPHEN AND CODEINE PHOSPHATE 300; 30 MG/1; MG/1
1-2 TABLET ORAL EVERY 6 HOURS PRN
Qty: 50 TABLET | Refills: 0 | OUTPATIENT
Start: 2018-02-23 | End: 2018-03-08 | Stop reason: SDUPTHER

## 2018-03-08 ENCOUNTER — TELEPHONE (OUTPATIENT)
Dept: INTERNAL MEDICINE CLINIC | Age: 73
End: 2018-03-08

## 2018-03-08 DIAGNOSIS — G89.29 CHRONIC BILATERAL LOW BACK PAIN WITH LEFT-SIDED SCIATICA: ICD-10-CM

## 2018-03-08 DIAGNOSIS — M54.42 CHRONIC BILATERAL LOW BACK PAIN WITH LEFT-SIDED SCIATICA: ICD-10-CM

## 2018-03-08 RX ORDER — ACETAMINOPHEN AND CODEINE PHOSPHATE 300; 30 MG/1; MG/1
1-2 TABLET ORAL EVERY 6 HOURS PRN
Qty: 50 TABLET | Refills: 0 | Status: SHIPPED | OUTPATIENT
Start: 2018-03-08 | End: 2018-03-27 | Stop reason: SDUPTHER

## 2018-03-20 ENCOUNTER — OFFICE VISIT (OUTPATIENT)
Dept: INTERNAL MEDICINE CLINIC | Age: 73
End: 2018-03-20
Payer: COMMERCIAL

## 2018-03-20 VITALS
DIASTOLIC BLOOD PRESSURE: 60 MMHG | WEIGHT: 146 LBS | SYSTOLIC BLOOD PRESSURE: 120 MMHG | HEIGHT: 64 IN | BODY MASS INDEX: 24.92 KG/M2

## 2018-03-20 DIAGNOSIS — E11.43 TYPE 2 DIABETES MELLITUS WITH DIABETIC AUTONOMIC NEUROPATHY, WITHOUT LONG-TERM CURRENT USE OF INSULIN (HCC): ICD-10-CM

## 2018-03-20 DIAGNOSIS — K59.03 DRUG-INDUCED CONSTIPATION: ICD-10-CM

## 2018-03-20 DIAGNOSIS — M51.36 DDD (DEGENERATIVE DISC DISEASE), LUMBAR: ICD-10-CM

## 2018-03-20 DIAGNOSIS — J44.1 CHRONIC OBSTRUCTIVE PULMONARY DISEASE WITH ACUTE EXACERBATION (HCC): ICD-10-CM

## 2018-03-20 DIAGNOSIS — I10 ESSENTIAL HYPERTENSION: Primary | ICD-10-CM

## 2018-03-20 PROCEDURE — 99214 OFFICE O/P EST MOD 30 MIN: CPT | Performed by: INTERNAL MEDICINE

## 2018-03-20 ASSESSMENT — ENCOUNTER SYMPTOMS
SORE THROAT: 0
VOICE CHANGE: 0
TROUBLE SWALLOWING: 0
PHOTOPHOBIA: 0
STRIDOR: 0
CONSTIPATION: 1
RHINORRHEA: 0
DIARRHEA: 0
EYE ITCHING: 0
COUGH: 0
COLOR CHANGE: 0
BLOOD IN STOOL: 0
SINUS PRESSURE: 0
CHOKING: 0
ANAL BLEEDING: 0
RECTAL PAIN: 0
NAUSEA: 0
ABDOMINAL PAIN: 0
CHEST TIGHTNESS: 0
EYE PAIN: 0
BACK PAIN: 1
EYE DISCHARGE: 0
EYE REDNESS: 0
SHORTNESS OF BREATH: 0
FACIAL SWELLING: 0
APNEA: 0
VOMITING: 0
ABDOMINAL DISTENTION: 0
WHEEZING: 0

## 2018-03-20 NOTE — PROGRESS NOTES
Types: Cigarettes     Start date: 8/11/1955     Quit date: 8/11/1990    Smokeless tobacco: Never Used    Alcohol use No    Drug use: No    Sexual activity: Not Currently     Partners: Male     Birth control/ protection: Post-menopausal     Other Topics Concern    None     Social History Narrative    None       Current Outpatient Prescriptions   Medication Sig Dispense Refill    acetaminophen-codeine (TYLENOL #3) 300-30 MG per tablet Take 1-2 tablets by mouth every 6 hours as needed for Pain for up to 28 days. 50 tablet 0    lidocaine (LIDODERM) 5 % Place 1 patch onto the skin daily 12 hours on, 12 hours off. 30 patch 0    omeprazole (PRILOSEC) 20 MG delayed release capsule TAKE 1 CAPSULE BY MOUTH DAILY 30 capsule 3    losartan (COZAAR) 100 MG tablet TAKE 1 TABLET EVERY DAY 90 tablet 3    amLODIPine (NORVASC) 10 MG tablet TAKE 1 TABLET BY MOUTH DAILY 90 tablet 3    glimepiride (AMARYL) 2 MG tablet TAKE 1 TABLET EVERY MORNING BEFORE BREAKFAST 90 tablet 2    SYMBICORT 160-4.5 MCG/ACT AERO INHALE 2 PUFFS TWICE DAILY 30.6 Inhaler 5    Cholecalciferol (VITAMIN D) 2000 UNITS CAPS capsule Take 2,000 Units by mouth daily      vitamin E 400 UNIT capsule Take 400 Units by mouth daily      methylcellulose (CITRUCEL) 500 MG TABS Take by mouth      butalbital-acetaminophen-caffeine (FIORICET, ESGIC) per tablet       CALCIUM CITRATE PO Take  by mouth daily. No current facility-administered medications for this visit. Review of Systems:    Review of Systems   Constitutional: Positive for fatigue. Negative for activity change, appetite change, chills, diaphoresis and fever. HENT: Negative for congestion, dental problem, drooling, ear discharge, ear pain, facial swelling, hearing loss, mouth sores, nosebleeds, postnasal drip, rhinorrhea, sinus pressure, sneezing, sore throat, tinnitus, trouble swallowing and voice change.     Eyes: Negative for photophobia, pain, discharge, redness, itching and visual disturbance. Respiratory: Negative for apnea, cough, choking, chest tightness, shortness of breath, wheezing and stridor. Cardiovascular: Negative for chest pain, palpitations and leg swelling. Gastrointestinal: Positive for constipation. Negative for abdominal distention, abdominal pain, anal bleeding, blood in stool, diarrhea, nausea, rectal pain and vomiting. Endocrine: Negative for cold intolerance, heat intolerance, polydipsia, polyphagia and polyuria. Genitourinary: Negative for decreased urine volume, difficulty urinating, dyspareunia, dysuria, enuresis, flank pain, frequency, genital sores, hematuria, menstrual problem, pelvic pain, urgency, vaginal bleeding and vaginal discharge. Musculoskeletal: Positive for back pain. Negative for arthralgias, gait problem, joint swelling, myalgias, neck pain and neck stiffness. Skin: Negative for color change, pallor, rash and wound. Neurological: Negative for dizziness, tremors, seizures, syncope, facial asymmetry, speech difficulty, weakness, light-headedness, numbness and headaches. Hematological: Negative for adenopathy. Does not bruise/bleed easily. Psychiatric/Behavioral: Positive for sleep disturbance. Negative for agitation, behavioral problems, confusion, decreased concentration and dysphoric mood. The patient is nervous/anxious. Objective:     Physical Exam:      Physical Exam   Constitutional: She is oriented to person, place, and time. She appears well-developed and well-nourished. No distress. HENT:   Head: Normocephalic and atraumatic. Right Ear: External ear normal.   Left Ear: External ear normal.   Nose: Nose normal.   Mouth/Throat: Oropharynx is clear and moist. No oropharyngeal exudate. Eyes: Conjunctivae and EOM are normal. Pupils are equal, round, and reactive to light. Right eye exhibits no discharge. Left eye exhibits no discharge. No scleral icterus. Neck: Normal range of motion. Neck supple.  No JVD patient questions answered. Patient voiced understanding. 1.  Patient given educational materials - see patient instructions  2. Discussed use, benefit, and side effects of prescribed medications. Barriers to medication compliance addressed. All patient questions answered. Pt voiced understanding. 3.  Reviewed prior labs and health maintenance  4. Continue current medications, diet and exercise.   5.  6 weeks

## 2018-03-27 DIAGNOSIS — G89.29 CHRONIC BILATERAL LOW BACK PAIN WITH LEFT-SIDED SCIATICA: ICD-10-CM

## 2018-03-27 DIAGNOSIS — M54.42 CHRONIC BILATERAL LOW BACK PAIN WITH LEFT-SIDED SCIATICA: ICD-10-CM

## 2018-03-27 NOTE — LETTER
including social, physical, psychological and daily or work activities. 2. I will not use illegal or street drugs or another person's prescription. If I have an addiction problem with drugs or alcohol and my provider asks me to enter a program to address this issue, I agree to follow through. Such programs may include:  · 12-Step program and securing a sponsor  · Individual counseling   · Inpatient or outpatient treatment  · Other:_____________________________________________________________________________________________________________________________________________    If in treatment, I will request that a copy of the programs initial evaluation and treatment recommendations be sent to this provider and will not expect refills until that is received. I will also request written monthly updates be sent to this provider to verify my continuing treatment. 3. I will consent to drug screening upon my providers request to assure I am only taking the prescribed drugs, described in this MEDICATION AGREEMENT. I understand that a drug screen is a laboratory test in which a sample of my urine, blood or saliva is checked to see what drugs I have been taking. 4. I agree that I will treat the providers and staff at this office with respect at all times. I will keep all of my scheduled appointments, but if I need to cancel my appointment, I will do so a minimum of 24 hours before it is scheduled. 5. I understand that this provider may stop prescribing the medications listed if:  · I do not show any improvement in pain, or my activity has not improved. · I develop rapid tolerance or loss of improvement, as described in my treatment plan. · I develop significant side effects from the medication. · My behavior is inconsistent with the responsibilities outlined above, which may also result in my being prevented from receiving further care from this office.   ·

## 2018-03-29 RX ORDER — ACETAMINOPHEN AND CODEINE PHOSPHATE 300; 30 MG/1; MG/1
1-2 TABLET ORAL EVERY 6 HOURS PRN
Qty: 50 TABLET | Refills: 0 | Status: SHIPPED | OUTPATIENT
Start: 2018-03-29 | End: 2018-04-26

## 2018-03-29 NOTE — TELEPHONE ENCOUNTER
Prescription printed, writer called [id number] and spoke w/ [place] to call medication in. Printed script destroyed.

## 2018-03-29 NOTE — TELEPHONE ENCOUNTER
Patient called to check status. I advised that we were still waiting for this to be reviewed. She got very upset with me, started talking over me any time she would ask a question. She didn't give me time to answer before she would move on to yelling about something else. Patient stated she didn't understand what was taking so long and then hung up on me. While in process of trying to document, she called back and became irate again. Said she doesn't understand why the doctor is making her beg for her medication. She continued to talk over me, said that it's not fair that she has to be in pain. Wished that Dr Terrie Rivas would have explained to her what kind of medication he was putting her on when he prescribed it. At the last visit, she wanted an alternative medication because she thought it was causing constipation. She was recommended surgery, but refused due to her anxiety. She stated she had a survey at home and she knew what she was going to put on it and then hung up on me again.

## 2018-04-04 RX ORDER — GLIMEPIRIDE 2 MG/1
TABLET ORAL
Qty: 90 TABLET | Refills: 3 | Status: SHIPPED | OUTPATIENT
Start: 2018-04-04 | End: 2019-04-02 | Stop reason: SDUPTHER

## 2018-04-27 RX ORDER — BUDESONIDE AND FORMOTEROL FUMARATE DIHYDRATE 160; 4.5 UG/1; UG/1
AEROSOL RESPIRATORY (INHALATION)
Qty: 10.2 G | Refills: 4 | Status: SHIPPED | OUTPATIENT
Start: 2018-04-27 | End: 2018-09-06 | Stop reason: SDUPTHER

## 2018-05-10 ENCOUNTER — OFFICE VISIT (OUTPATIENT)
Dept: INTERNAL MEDICINE CLINIC | Age: 73
End: 2018-05-10
Payer: COMMERCIAL

## 2018-05-10 VITALS
DIASTOLIC BLOOD PRESSURE: 72 MMHG | BODY MASS INDEX: 25.78 KG/M2 | HEART RATE: 71 BPM | WEIGHT: 151 LBS | HEIGHT: 64 IN | SYSTOLIC BLOOD PRESSURE: 138 MMHG

## 2018-05-10 DIAGNOSIS — I10 ESSENTIAL HYPERTENSION: ICD-10-CM

## 2018-05-10 DIAGNOSIS — J44.1 CHRONIC OBSTRUCTIVE PULMONARY DISEASE WITH ACUTE EXACERBATION (HCC): ICD-10-CM

## 2018-05-10 DIAGNOSIS — I67.1 CEREBRAL ANEURYSM, NONRUPTURED: ICD-10-CM

## 2018-05-10 DIAGNOSIS — R60.0 BILATERAL LEG EDEMA: Primary | ICD-10-CM

## 2018-05-10 PROCEDURE — 99214 OFFICE O/P EST MOD 30 MIN: CPT | Performed by: INTERNAL MEDICINE

## 2018-05-10 RX ORDER — GABAPENTIN 300 MG/1
CAPSULE ORAL
COMMUNITY
Start: 2018-04-19 | End: 2018-06-07

## 2018-06-06 ENCOUNTER — NURSE ONLY (OUTPATIENT)
Dept: INTERNAL MEDICINE CLINIC | Age: 73
End: 2018-06-06

## 2018-06-06 VITALS — DIASTOLIC BLOOD PRESSURE: 94 MMHG | SYSTOLIC BLOOD PRESSURE: 174 MMHG

## 2018-06-06 DIAGNOSIS — I10 ESSENTIAL HYPERTENSION: Primary | ICD-10-CM

## 2018-06-07 ENCOUNTER — OFFICE VISIT (OUTPATIENT)
Dept: INTERNAL MEDICINE CLINIC | Age: 73
End: 2018-06-07
Payer: COMMERCIAL

## 2018-06-07 VITALS
HEIGHT: 64 IN | SYSTOLIC BLOOD PRESSURE: 160 MMHG | WEIGHT: 148 LBS | BODY MASS INDEX: 25.27 KG/M2 | DIASTOLIC BLOOD PRESSURE: 70 MMHG

## 2018-06-07 DIAGNOSIS — E11.43 TYPE 2 DIABETES MELLITUS WITH DIABETIC AUTONOMIC NEUROPATHY, WITHOUT LONG-TERM CURRENT USE OF INSULIN (HCC): ICD-10-CM

## 2018-06-07 DIAGNOSIS — I10 ESSENTIAL HYPERTENSION: Primary | ICD-10-CM

## 2018-06-07 DIAGNOSIS — I67.1 CEREBRAL ANEURYSM, NONRUPTURED: ICD-10-CM

## 2018-06-07 DIAGNOSIS — M48.061 SPINAL STENOSIS OF LUMBAR REGION WITHOUT NEUROGENIC CLAUDICATION: ICD-10-CM

## 2018-06-07 PROCEDURE — 99214 OFFICE O/P EST MOD 30 MIN: CPT | Performed by: INTERNAL MEDICINE

## 2018-06-07 RX ORDER — DULOXETIN HYDROCHLORIDE 30 MG/1
30 CAPSULE, DELAYED RELEASE ORAL DAILY
Qty: 30 CAPSULE | Refills: 3 | Status: SHIPPED | OUTPATIENT
Start: 2018-06-07 | End: 2018-07-03

## 2018-06-07 RX ORDER — METOPROLOL SUCCINATE 50 MG/1
50 TABLET, EXTENDED RELEASE ORAL DAILY
Qty: 30 TABLET | Refills: 3 | Status: SHIPPED | OUTPATIENT
Start: 2018-06-07 | End: 2018-09-06 | Stop reason: SDUPTHER

## 2018-06-07 ASSESSMENT — ENCOUNTER SYMPTOMS
EYE DISCHARGE: 0
COLOR CHANGE: 0
CHEST TIGHTNESS: 0
EYE ITCHING: 0
RECTAL PAIN: 0
VOICE CHANGE: 0
STRIDOR: 0
FACIAL SWELLING: 0
EYE REDNESS: 0
SINUS PRESSURE: 0
DIARRHEA: 0
VOMITING: 0
APNEA: 0
BACK PAIN: 1
ANAL BLEEDING: 0
PHOTOPHOBIA: 0
EYE PAIN: 0
CHOKING: 0
COUGH: 0
TROUBLE SWALLOWING: 0
ABDOMINAL PAIN: 0
SORE THROAT: 0
NAUSEA: 0
RHINORRHEA: 0
ABDOMINAL DISTENTION: 0
SHORTNESS OF BREATH: 1
CONSTIPATION: 0
WHEEZING: 0
BLOOD IN STOOL: 0

## 2018-06-19 ENCOUNTER — TELEPHONE (OUTPATIENT)
Dept: INTERNAL MEDICINE CLINIC | Age: 73
End: 2018-06-19

## 2018-06-20 RX ORDER — OMEPRAZOLE 20 MG/1
CAPSULE, DELAYED RELEASE ORAL
Qty: 30 CAPSULE | Refills: 11 | Status: SHIPPED | OUTPATIENT
Start: 2018-06-20 | End: 2018-09-06 | Stop reason: SDUPTHER

## 2018-07-03 ENCOUNTER — OFFICE VISIT (OUTPATIENT)
Dept: INTERNAL MEDICINE CLINIC | Age: 73
End: 2018-07-03
Payer: COMMERCIAL

## 2018-07-03 VITALS
DIASTOLIC BLOOD PRESSURE: 70 MMHG | WEIGHT: 142 LBS | SYSTOLIC BLOOD PRESSURE: 102 MMHG | HEIGHT: 64 IN | BODY MASS INDEX: 24.24 KG/M2

## 2018-07-03 DIAGNOSIS — I10 ESSENTIAL HYPERTENSION: Primary | ICD-10-CM

## 2018-07-03 DIAGNOSIS — J44.1 CHRONIC OBSTRUCTIVE PULMONARY DISEASE WITH ACUTE EXACERBATION (HCC): ICD-10-CM

## 2018-07-03 DIAGNOSIS — M51.36 DDD (DEGENERATIVE DISC DISEASE), LUMBAR: ICD-10-CM

## 2018-07-03 DIAGNOSIS — E11.43 TYPE 2 DIABETES MELLITUS WITH DIABETIC AUTONOMIC NEUROPATHY, WITHOUT LONG-TERM CURRENT USE OF INSULIN (HCC): ICD-10-CM

## 2018-07-03 DIAGNOSIS — I16.1 HYPERTENSIVE EMERGENCY: ICD-10-CM

## 2018-07-03 PROCEDURE — 99495 TRANSJ CARE MGMT MOD F2F 14D: CPT | Performed by: INTERNAL MEDICINE

## 2018-07-03 RX ORDER — GABAPENTIN 300 MG/1
300 CAPSULE ORAL 3 TIMES DAILY
Qty: 90 CAPSULE | Refills: 0 | Status: SHIPPED | OUTPATIENT
Start: 2018-07-03 | End: 2019-04-16

## 2018-07-03 RX ORDER — LEVETIRACETAM 250 MG/1
TABLET ORAL
COMMUNITY
Start: 2018-06-19 | End: 2018-07-03

## 2018-07-03 RX ORDER — ROSUVASTATIN CALCIUM 10 MG/1
10 TABLET, COATED ORAL DAILY
COMMUNITY
Start: 2018-06-19 | End: 2018-07-09 | Stop reason: SDUPTHER

## 2018-07-03 RX ORDER — CLOPIDOGREL BISULFATE 75 MG/1
75 TABLET ORAL DAILY
COMMUNITY
Start: 2018-06-19 | End: 2018-07-09 | Stop reason: SDUPTHER

## 2018-07-03 RX ORDER — AMLODIPINE BESYLATE 10 MG/1
10 TABLET ORAL DAILY
COMMUNITY
End: 2018-09-06 | Stop reason: SDUPTHER

## 2018-07-03 ASSESSMENT — ENCOUNTER SYMPTOMS
ABDOMINAL PAIN: 0
APNEA: 0
NAUSEA: 0
BLOOD IN STOOL: 0
WHEEZING: 0
VOMITING: 0
CONSTIPATION: 0
TROUBLE SWALLOWING: 0
RECTAL PAIN: 0
EYE PAIN: 0
EYE REDNESS: 0
COLOR CHANGE: 0
CHOKING: 0
EYE ITCHING: 0
ANAL BLEEDING: 0
DIARRHEA: 0
PHOTOPHOBIA: 0
ABDOMINAL DISTENTION: 0
SINUS PRESSURE: 0
BACK PAIN: 1
SORE THROAT: 0
COUGH: 0
RHINORRHEA: 0
VOICE CHANGE: 0
CHEST TIGHTNESS: 0
SHORTNESS OF BREATH: 1
STRIDOR: 0
FACIAL SWELLING: 0
EYE DISCHARGE: 0

## 2018-07-03 NOTE — PROGRESS NOTES
Subjective:      Mandi Brown is a 75 y.o. female who presents today for follow up and management of her chronic medical conditions as noted below. HPI:    Mandi Brown is c/o of   Chief Complaint   Patient presents with    Hypertension     hospital follow up post cva     Back Pain     taking tylenol not helping     Diabetes     controlled    .   ? tia     hypertensive  Encephalopathy   had avm      bp was high     Past Medical History:   Diagnosis Date    Arthritis     Asthma     Cerebral aneurysm, nonruptured     COPD (chronic obstructive pulmonary disease) ([address]     DDD (degenerative disc disease), lumbar 4/18/2017    Diabetes ([address]     GERD (gastroesophageal reflux disease)     Herpes zoster with other nervous system complications(053.19)     Hypertension     Lumbago     Migraine     Myoclonus     Neck pain     Osteoporosis     Other and unspecified hyperlipidemia     Palpitations     COMPA (renal artery stenosis) ([address] 7/9/2015    Type 2 diabetes mellitus with autonomic neuropathy ([facility] 9/9/2015    Vision abnormalities     glasses    Vitamin D deficiency        Past Surgical History:   Procedure Laterality Date    COLONOSCOPY  2015    CYST REMOVAL  11/2016    vaginal     DILATION AND CURETTAGE      DILATION AND CURETTAGE  10/5/15    w/ hysteroscopy    DILATION AND CURETTAGE OF UTERUS      FRACTURE SURGERY Left     HYSTEROSCOPY  10/5/15    w/ d&c    LAPAROSCOPY      UPPER GASTROINTESTINAL ENDOSCOPY         Family History   Problem Relation Age of Onset    Cancer Mother         BRAIN    Cancer Father         PANCREATIC    Cancer Sister         SKIN    Heart Disease Maternal Aunt        Social History     Social History    Marital status:      Spouse name: N/A    Number of children: N/A    Years of education: N/A     Social History Main Topics    Smoking status: Former Smoker     Packs/day: 0.25     Years: 30.00     Types: Cigarettes     Start date: 8/11/1955 Quit date: 8/11/1990    Smokeless tobacco: Never Used    Alcohol use No    Drug use: No    Sexual activity: Not Currently     Partners: Male     Birth control/ protection: Post-menopausal     Other Topics Concern    None     Social History Narrative    None       Current Outpatient Prescriptions   Medication Sig Dispense Refill    rosuvastatin (CRESTOR) 10 MG tablet Take 10 mg by mouth daily       clopidogrel (PLAVIX) 75 MG tablet Take 75 mg by mouth daily       amLODIPine (NORVASC) 10 MG tablet Take 10 mg by mouth daily      gabapentin (NEURONTIN) 300 MG capsule Take 1 capsule by mouth 3 times daily. . 90 capsule 0    omeprazole (PRILOSEC) 20 MG delayed release capsule TAKE 1 CAPSULE BY MOUTH DAILY 30 capsule 11    metoprolol succinate (TOPROL XL) 50 MG extended release tablet Take 1 tablet by mouth daily 30 tablet 3    SYMBICORT 160-4.5 MCG/ACT AERO INHALE 2 PUFFS TWICE DAILY 10.2 g 4    glimepiride (AMARYL) 2 MG tablet TAKE 1 TABLET EVERY MORNING BEFORE BREAKFAST 90 tablet 3    losartan (COZAAR) 100 MG tablet TAKE 1 TABLET EVERY DAY 90 tablet 3    Cholecalciferol (VITAMIN D) 2000 UNITS CAPS capsule Take 2,000 Units by mouth daily      vitamin E 400 UNIT capsule Take 400 Units by mouth daily      methylcellulose (CITRUCEL) 500 MG TABS Take by mouth      butalbital-acetaminophen-caffeine (FIORICET, ESGIC) per tablet       CALCIUM CITRATE PO Take  by mouth daily. No current facility-administered medications for this visit. Review of Systems:    Review of Systems   Constitutional: Positive for fatigue. Negative for activity change, appetite change, chills, diaphoresis and fever. HENT: Negative for congestion, dental problem, drooling, ear discharge, ear pain, facial swelling, hearing loss, mouth sores, nosebleeds, postnasal drip, rhinorrhea, sinus pressure, sneezing, sore throat, tinnitus, trouble swallowing and voice change.     Eyes: Negative for photophobia, pain, discharge, motion. Neck supple. No JVD present. No tracheal deviation present. No thyromegaly present. Cardiovascular: Normal rate, regular rhythm, normal heart sounds and intact distal pulses. Exam reveals no gallop and no friction rub. No murmur heard. Pulmonary/Chest: Effort normal and breath sounds normal. No respiratory distress. She has no wheezes. She has no rales. She exhibits no tenderness. Abdominal: Soft. Bowel sounds are normal. She exhibits no distension and no mass. There is no tenderness. There is no rebound and no guarding. Genitourinary: Rectal exam shows guaiac negative stool. No vaginal discharge found. Musculoskeletal: She exhibits tenderness. She exhibits no edema. Lymphadenopathy:     She has no cervical adenopathy. Neurological: She is alert and oriented to person, place, and time. She has normal reflexes. No cranial nerve deficit. She exhibits normal muscle tone. Coordination normal.   Skin: Skin is warm and dry. No rash noted. She is not diaphoretic. No erythema. No pallor. Psychiatric: Her behavior is normal. Judgment and thought content normal. Her mood appears anxious.          Results for orders placed or performed in visit on 10/04/17   Hemoglobin A1C   Result Value Ref Range    Hemoglobin A1C 6.8 %    AVERAGE GLUCOSE 148    Comprehensive Metabolic Panel   Result Value Ref Range    Sodium 143 mmol/L    Chloride 104 mmol/L    Potassium 3.6 mmol/L    BUN 18 mg/dL    CREATININE 0.84     Glucose 132 mg/dL    AST 16 U/L    ALT 19 U/L    Calcium 9.4 mg/dL    Total Protein 7.1     CO2 31 mmol/L    Alb 4.3     Alkaline Phosphatase 61 U/L    Total Bilirubin 0.5 0.1 - 1.4 mg/dL    Gfr Calculated >60     Anion Gap 8 mmol/L   CBC Auto Differential   Result Value Ref Range    WBC 4.9 10^3/mL    RBC 4.51 10^6/µL    Hemoglobin 13.5 12.0 - 16.0 g/dL    Hematocrit 39.7 36 - 46 %    MCV 88 fL    MCH 29.9 pg    MCHC 33.9 g/dL    Platelets 524 K/µL    RDW 14.1 %    MPV 7.8 fL    Neutrophils % 67.2 % Lymphocytes % 21.0 %    Monocytes % 9.1 %    Eosinophils % 2.1 %    Basophils % 0.6 %    Neutrophils # 3.3 /µL    Lymphocytes # 1.0 /µL    Monocytes # 0.4 /µL    Eosinophils # 0.1 /µL    Basophils # 0.0 /µL   Lipid Panel   Result Value Ref Range    Cholesterol, Total 251 mg/dL    HDL 67 35 - 70 mg/dL    LDL Calculated 158 0 - 160 mg/dL    Triglycerides 132 mg/dL    Chol/HDL Ratio 3.7     VLDL 26 mg/dL   Urinalysis   Result Value Ref Range    pH, UA 6.0 4.5 - 8.0    Clarity, UA Clear Clear    Protein, UA Negative Negative    Glucose, Ur Negative     Ketones, Urine Negative     Bilirubin, Urine Negative     Urobilinogen, Urine Normal     Nitrite, Urine Negative     Specific Gravity, Urine 1.023     Color, UA Yellow     Leukocyte Esterase, Urine Negative     Blood, Urine Negative    Microalbumin, Ur   Result Value Ref Range    Microalb, Ur 1.0     Creatinine, Ur 202.60 mg/dL   Vitamin D 25 Hydroxy   Result Value Ref Range    Vit D, 25-Hydroxy      Vitamin D3,25 Hydroxy 41.1     Vitamin D2, 25 Hydroxy       No results found. Assessment:      Diagnosis Orders   1. Essential hypertension   was inpt had crisis encephalopathy  Back on norvasc on [facility] and lopressor  Cont    2. Type 2 diabetes mellitus with diabetic autonomic neuropathy, without long-term current use of insulin (HCC)    6.8 cont same   3. DDD (degenerative disc disease), lumbar   chr pain  Add  gabapentin   4. Hypertensive emergency   betetr back on norvasc  ? Anxiety   Juice    5. Chronic obstructive pulmonary disease with acute exacerbation (HCC)   baseline      avm      water therapy    Plan:     No orders of the defined types were placed in this encounter. Orders Placed This Encounter   Medications    gabapentin (NEURONTIN) 300 MG capsule     Sig: Take 1 capsule by mouth 3 times daily. .     Dispense:  90 capsule     Refill:  0      cont tylenol      Cont all same meds     stress management       Back exercises     restart neurontin 300 mg phoebe Huang received counseling on the following healthy behaviors: medication adherence  Reviewed prior labs and health maintenance. Continue current medications, diet and exercise. Discussed use, benefit, and side effects of prescribed medications. Barriers to medication compliance addressed. Patient given educational materials - see patient instructions. All patient questions answered. Patient voiced understanding. 1.  Patient given educational materials - see patient instructions  2. Discussed use, benefit, and side effects of prescribed medications. Barriers to medication compliance addressed. All patient questions answered. Pt voiced understanding. 3.  Reviewed prior labs and health maintenance  4. Continue current medications, diet and exercise.   5.   3 m

## 2018-07-03 NOTE — PROGRESS NOTES
Chronic Disease Visit Information    BP Readings from Last 3 Encounters:   06/07/18 (!) 160/70   06/06/18 (!) 174/94   05/10/18 138/72          Hemoglobin A1C (%)   Date Value   09/26/2017 6.8   02/08/2017 6.3   09/15/2016 6.3     Microalb/Crt. Ratio (mcg/mg creat)   Date Value   06/24/2014 7     LDL Cholesterol (mg/dL)   Date Value   06/24/2014 80     LDL Calculated (mg/dL)   Date Value   09/26/2017 158     HDL (mg/dL)   Date Value   09/26/2017 67     BUN (mg/dL)   Date Value   09/26/2017 18     CREATININE (no units)   Date Value   09/26/2017 0.84     Glucose (mg/dL)   Date Value   09/26/2017 132            Have you changed or started any medications since your last visit including any over-the-counter medicines, vitamins, or herbal medicines? no   Are you having any side effects from any of your medications? -  no  Have you stopped taking any of your medications? Is so, why? -  no    Have you seen any other physician or provider since your last visit? Yes - Records Obtained  Have you had any other diagnostic tests since your last visit? Yes - Records Obtained  Have you been seen in the emergency room and/or had an admission to a hospital since we last saw you? Yes - Records Obtained  Have you had your annual diabetic retinal (eye) exam? No  Have you had your routine dental cleaning in the past 6 months? no    Have you activated your [id number] account? If not, what are your barriers?  No: declined      Patient Care Team:  Rich Post MD as PCP - General (Internal Medicine)  Idalia Cabral DO as Consulting Physician (Obstetrics & Gynecology)         Medical History Review  Past Medical, Family, and Social History reviewed and does contribute to the patient presenting condition  Chief Complaint   Patient presents with    Hypertension     hospital follow up     Back Pain     taking tylenol not helping      Health Maintenance   Topic Date Due    DTaP/Tdap/Td vaccine (1 - Tdap) 02/17/1963    Shingles Vaccine (1 of 2 - 2 Dose Series) 02/17/1994    Flu vaccine (1) 09/01/2018    Diabetic foot exam  09/15/2018    A1C test (Diabetic or Prediabetic)  09/26/2018    Diabetic microalbuminuria test  09/26/2018    Lipid screen  09/26/2018    Potassium monitoring  09/26/2018    Creatinine monitoring  09/26/2018    Diabetic retinal exam  11/28/2018    Breast cancer screen  01/23/2019    Colon cancer screen colonoscopy  04/27/2025    DEXA (modify frequency per FRAX score)  Completed    Pneumococcal low/med risk  Completed

## 2018-07-09 RX ORDER — ROSUVASTATIN CALCIUM 10 MG/1
10 TABLET, COATED ORAL DAILY
Qty: 30 TABLET | Refills: 5 | Status: SHIPPED | OUTPATIENT
Start: 2018-07-09 | End: 2018-09-06 | Stop reason: SDUPTHER

## 2018-07-09 RX ORDER — CLOPIDOGREL BISULFATE 75 MG/1
75 TABLET ORAL DAILY
Qty: 30 TABLET | Refills: 5 | Status: SHIPPED | OUTPATIENT
Start: 2018-07-09 | End: 2018-09-06 | Stop reason: SDUPTHER

## 2018-09-06 ENCOUNTER — OFFICE VISIT (OUTPATIENT)
Dept: INTERNAL MEDICINE CLINIC | Age: 73
End: 2018-09-06
Payer: COMMERCIAL

## 2018-09-06 VITALS
DIASTOLIC BLOOD PRESSURE: 72 MMHG | WEIGHT: 146 LBS | BODY MASS INDEX: 24.92 KG/M2 | SYSTOLIC BLOOD PRESSURE: 123 MMHG | HEIGHT: 64 IN | HEART RATE: 61 BPM

## 2018-09-06 DIAGNOSIS — R42 VERTIGO: ICD-10-CM

## 2018-09-06 DIAGNOSIS — J32.0 CHRONIC MAXILLARY SINUSITIS: ICD-10-CM

## 2018-09-06 DIAGNOSIS — E11.43 TYPE 2 DIABETES MELLITUS WITH DIABETIC AUTONOMIC NEUROPATHY, WITHOUT LONG-TERM CURRENT USE OF INSULIN (HCC): ICD-10-CM

## 2018-09-06 DIAGNOSIS — I10 ESSENTIAL HYPERTENSION: Primary | ICD-10-CM

## 2018-09-06 DIAGNOSIS — I63.131 CEREBROVASCULAR ACCIDENT (CVA) DUE TO EMBOLISM OF RIGHT CAROTID ARTERY (HCC): ICD-10-CM

## 2018-09-06 DIAGNOSIS — G43.909 MIGRAINE WITHOUT STATUS MIGRAINOSUS, NOT INTRACTABLE, UNSPECIFIED MIGRAINE TYPE: ICD-10-CM

## 2018-09-06 PROCEDURE — 99214 OFFICE O/P EST MOD 30 MIN: CPT | Performed by: INTERNAL MEDICINE

## 2018-09-06 RX ORDER — BUDESONIDE AND FORMOTEROL FUMARATE DIHYDRATE 160; 4.5 UG/1; UG/1
2 AEROSOL RESPIRATORY (INHALATION) 2 TIMES DAILY
Qty: 3 INHALER | Refills: 3 | Status: SHIPPED | OUTPATIENT
Start: 2018-09-06 | End: 2019-09-08 | Stop reason: SDUPTHER

## 2018-09-06 RX ORDER — BUTALBITAL, ACETAMINOPHEN AND CAFFEINE 50; 325; 40 MG/1; MG/1; MG/1
1 TABLET ORAL EVERY 6 HOURS PRN
Qty: 180 TABLET | Refills: 1 | Status: SHIPPED | OUTPATIENT
Start: 2018-09-06 | End: 2018-09-06 | Stop reason: SDUPTHER

## 2018-09-06 RX ORDER — ROSUVASTATIN CALCIUM 10 MG/1
10 TABLET, COATED ORAL DAILY
Qty: 90 TABLET | Refills: 3 | Status: SHIPPED | OUTPATIENT
Start: 2018-09-06 | End: 2019-09-08 | Stop reason: SDUPTHER

## 2018-09-06 RX ORDER — OMEPRAZOLE 20 MG/1
20 CAPSULE, DELAYED RELEASE ORAL DAILY
Qty: 90 CAPSULE | Refills: 3 | Status: SHIPPED | OUTPATIENT
Start: 2018-09-06 | End: 2019-09-17 | Stop reason: SDUPTHER

## 2018-09-06 RX ORDER — CLOPIDOGREL BISULFATE 75 MG/1
75 TABLET ORAL DAILY
Qty: 90 TABLET | Refills: 3 | Status: SHIPPED | OUTPATIENT
Start: 2018-09-06 | End: 2019-09-08 | Stop reason: SDUPTHER

## 2018-09-06 RX ORDER — AMLODIPINE BESYLATE 10 MG/1
10 TABLET ORAL DAILY
Qty: 90 TABLET | Refills: 3 | Status: SHIPPED | OUTPATIENT
Start: 2018-09-06 | End: 2019-09-08 | Stop reason: SDUPTHER

## 2018-09-06 RX ORDER — BUTALBITAL, ACETAMINOPHEN AND CAFFEINE 50; 325; 40 MG/1; MG/1; MG/1
1 TABLET ORAL EVERY 6 HOURS PRN
Qty: 30 TABLET | Refills: 1 | Status: SHIPPED | OUTPATIENT
Start: 2018-09-06

## 2018-09-06 RX ORDER — METOPROLOL SUCCINATE 50 MG/1
50 TABLET, EXTENDED RELEASE ORAL DAILY
Qty: 90 TABLET | Refills: 3 | Status: SHIPPED | OUTPATIENT
Start: 2018-09-06 | End: 2019-09-08 | Stop reason: SDUPTHER

## 2018-09-06 ASSESSMENT — ENCOUNTER SYMPTOMS
BACK PAIN: 1
EYE PAIN: 0
SORE THROAT: 0
VOICE CHANGE: 0
SHORTNESS OF BREATH: 0
COUGH: 0
ABDOMINAL PAIN: 0
APNEA: 0
RECTAL PAIN: 0
BLOOD IN STOOL: 0
CHOKING: 0
RHINORRHEA: 0
ANAL BLEEDING: 0
CONSTIPATION: 0
EYE DISCHARGE: 0
ABDOMINAL DISTENTION: 0
DIARRHEA: 0
VOMITING: 0
FACIAL SWELLING: 0
PHOTOPHOBIA: 0
NAUSEA: 0
CHEST TIGHTNESS: 0
SINUS PRESSURE: 0
COLOR CHANGE: 0
TROUBLE SWALLOWING: 0
EYE ITCHING: 0
WHEEZING: 0
STRIDOR: 0
EYE REDNESS: 0

## 2018-09-06 NOTE — PROGRESS NOTES
Chronic Disease Visit Information    BP Readings from Last 3 Encounters:   07/03/18 102/70   06/07/18 (!) 160/70   06/06/18 (!) 174/94          Hemoglobin A1C (%)   Date Value   09/26/2017 6.8   02/08/2017 6.3   09/15/2016 6.3     Microalb/Crt. Ratio (mcg/mg creat)   Date Value   06/24/2014 7     LDL Cholesterol (mg/dL)   Date Value   06/24/2014 80     LDL Calculated (mg/dL)   Date Value   09/26/2017 158     HDL (mg/dL)   Date Value   09/26/2017 67     BUN (mg/dL)   Date Value   09/26/2017 18     CREATININE (no units)   Date Value   09/26/2017 0.84     Glucose (mg/dL)   Date Value   09/26/2017 132            Have you changed or started any medications since your last visit including any over-the-counter medicines, vitamins, or herbal medicines? no   Are you having any side effects from any of your medications? -  no  Have you stopped taking any of your medications? Is so, why? -  no    Have you seen any other physician or provider since your last visit? Yes - Records Obtained  Have you had any other diagnostic tests since your last visit? No  Have you been seen in the emergency room and/or had an admission to a hospital since we last saw you? No  Have you had your annual diabetic retinal (eye) exam? No  Have you had your routine dental cleaning in the past 6 months? no    Have you activated your [employer] account? If not, what are your barriers?  No: declined     Patient Care Team:  Jamel Sandhu MD as PCP - General (Internal Medicine)  Roma Ferrera DO as Consulting Physician (Obstetrics & Gynecology)         Medical History Review  Past Medical, Family, and Social History reviewed and does contribute to the patient presenting condition  Chief Complaint   Patient presents with    Hypertension     management     Dizziness     recent cva in June     Back Pain     ongoing     Headache     seeing neurology      Health Maintenance   Topic Date Due    DTaP/Tdap/Td vaccine (1 - Tdap) 02/17/1963    Shingles Vaccine (1 of 2 - 2 Dose Series) 02/17/1994    Flu vaccine (1) 09/01/2018    Diabetic foot exam  09/15/2018    A1C test (Diabetic or Prediabetic)  09/26/2018    Diabetic microalbuminuria test  09/26/2018    Lipid screen  09/26/2018    Potassium monitoring  09/26/2018    Creatinine monitoring  09/26/2018    Diabetic retinal exam  11/28/2018    Breast cancer screen  01/23/2019    Colon cancer screen colonoscopy  04/27/2025    DEXA (modify frequency per FRAX score)  Completed    Pneumococcal low/med risk  Completed

## 2018-09-06 NOTE — PROGRESS NOTES
pain, facial swelling, hearing loss, mouth sores, nosebleeds, postnasal drip, rhinorrhea, sinus pressure, sneezing, sore throat, tinnitus, trouble swallowing and voice change. Eyes: Negative for photophobia, pain, discharge, redness, itching and visual disturbance. Respiratory: Negative for apnea, cough, choking, chest tightness, shortness of breath, wheezing and stridor. Cardiovascular: Negative for chest pain, palpitations and leg swelling. Gastrointestinal: Negative for abdominal distention, abdominal pain, anal bleeding, blood in stool, constipation, diarrhea, nausea, rectal pain and vomiting. Endocrine: Negative for cold intolerance, heat intolerance, polydipsia, polyphagia and polyuria. Genitourinary: Negative for decreased urine volume, difficulty urinating, dyspareunia, dysuria, enuresis, flank pain, frequency, genital sores, hematuria, menstrual problem, pelvic pain, urgency, vaginal bleeding and vaginal discharge. Musculoskeletal: Positive for back pain. Negative for arthralgias, gait problem, joint swelling, myalgias, neck pain and neck stiffness. Skin: Negative for color change, pallor, rash and wound. Neurological: Positive for dizziness and headaches. Negative for tremors, seizures, syncope, facial asymmetry, speech difficulty, weakness, light-headedness and numbness. Hematological: Negative for adenopathy. Does not bruise/bleed easily. Psychiatric/Behavioral: Positive for sleep disturbance. Negative for agitation, behavioral problems, confusion, decreased concentration and dysphoric mood. The patient is not nervous/anxious. Objective:     Physical Exam:      Physical Exam   Cardiovascular:   Murmur heard. Musculoskeletal: She exhibits tenderness.          Results for orders placed or performed in visit on 10/04/17   Hemoglobin A1C   Result Value Ref Range    Hemoglobin A1C 6.8 %    AVERAGE GLUCOSE 148    Comprehensive Metabolic Panel   Result Value Ref Range Sodium 143 mmol/L    Chloride 104 mmol/L    Potassium 3.6 mmol/L    BUN 18 mg/dL    CREATININE 0.84     Glucose 132 mg/dL    AST 16 U/L    ALT 19 U/L    Calcium 9.4 mg/dL    Total Protein 7.1     CO2 31 mmol/L    Alb 4.3     Alkaline Phosphatase 61 U/L    Total Bilirubin 0.5 0.1 - 1.4 mg/dL    Gfr Calculated >60     Anion Gap 8 mmol/L   CBC Auto Differential   Result Value Ref Range    WBC 4.9 10^3/mL    RBC 4.51 10^6/µL    Hemoglobin 13.5 12.0 - 16.0 g/dL    Hematocrit 39.7 36 - 46 %    MCV 88 fL    MCH 29.9 pg    MCHC 33.9 g/dL    Platelets 904 K/µL    RDW 14.1 %    MPV 7.8 fL    Neutrophils % 67.2 %    Lymphocytes % 21.0 %    Monocytes % 9.1 %    Eosinophils % 2.1 %    Basophils % 0.6 %    Neutrophils # 3.3 /µL    Lymphocytes # 1.0 /µL    Monocytes # 0.4 /µL    Eosinophils # 0.1 /µL    Basophils # 0.0 /µL   Lipid Panel   Result Value Ref Range    Cholesterol, Total 251 mg/dL    HDL 67 35 - 70 mg/dL    LDL Calculated 158 0 - 160 mg/dL    Triglycerides 132 mg/dL    Chol/HDL Ratio 3.7     VLDL 26 mg/dL   Urinalysis   Result Value Ref Range    pH, UA 6.0 4.5 - 8.0    Clarity, UA Clear Clear    Protein, UA Negative Negative    Glucose, Ur Negative     Ketones, Urine Negative     Bilirubin, Urine Negative     Urobilinogen, Urine Normal     Nitrite, Urine Negative     Specific Gravity, Urine 1.023     Color, UA Yellow     Leukocyte Esterase, Urine Negative     Blood, Urine Negative    Microalbumin, Ur   Result Value Ref Range    Microalb, Ur 1.0     Creatinine, Ur 202.60 mg/dL   Vitamin D 25 Hydroxy   Result Value Ref Range    Vit D, 25-Hydroxy      Vitamin D3,25 Hydroxy 41.1     Vitamin D2, 25 Hydroxy       No results found. Assessment:      Diagnosis Orders   1. Essential hypertension  Controlled cont same   2. Type 2 diabetes mellitus with diabetic autonomic neuropathy, without long-term current use of insulin (HCC)    comtrolled hba1c 6.8   3. Vertigo   episodic likely from congestioon  And eat related     4. Cerebrovascular accident (CVA) due to embolism of right carotid artery ([address]    Doing well  Deficit recovered   5. Migraine without status migrainosus, not intractable, unspecified migraine type   use fiorcet         Plan:     No orders of the defined types were placed in this encounter. Orders Placed This Encounter   Medications    budesonide-formoterol (SYMBICORT) 160-4.5 MCG/ACT AERO     Sig: Inhale 2 puffs into the lungs 2 times daily     Dispense:  3 Inhaler     Refill:  3    amLODIPine (NORVASC) 10 MG tablet     Sig: Take 1 tablet by mouth daily     Dispense:  90 tablet     Refill:  3    metoprolol succinate (TOPROL XL) 50 MG extended release tablet     Sig: Take 1 tablet by mouth daily     Dispense:  90 tablet     Refill:  3    rosuvastatin (CRESTOR) 10 MG tablet     Sig: Take 1 tablet by mouth daily     Dispense:  90 tablet     Refill:  3    omeprazole (PRILOSEC) 20 MG delayed release capsule     Sig: Take 1 capsule by mouth Daily     Dispense:  90 capsule     Refill:  3    clopidogrel (PLAVIX) 75 MG tablet     Sig: Take 1 tablet by mouth daily     Dispense:  90 tablet     Refill:  3    butalbital-acetaminophen-caffeine (FIORICET, ESGIC) -40 MG per tablet     Sig: Take 1 tablet by mouth every 6 hours as needed for Headaches     Dispense:  180 tablet     Refill:  1        cont samememds     nasal spray      [place] received counseling on the following healthy behaviors: nutrition, exercise and medication adherence  Reviewed prior labs and health maintenance. Continue current medications, diet and exercise. Discussed use, benefit, and side effects of prescribed medications. Barriers to medication compliance addressed. Patient given educational materials - see patient instructions. All patient questions answered. Patient voiced understanding. 1.  Patient given educational materials - see patient instructions  2. Discussed use, benefit, and side effects of prescribed medications.

## 2018-09-17 RX ORDER — LOSARTAN POTASSIUM 100 MG/1
TABLET ORAL
Qty: 90 TABLET | Refills: 3 | Status: SHIPPED | OUTPATIENT
Start: 2018-09-17 | End: 2019-09-08 | Stop reason: SDUPTHER

## 2018-12-06 ENCOUNTER — OFFICE VISIT (OUTPATIENT)
Dept: INTERNAL MEDICINE CLINIC | Age: 73
End: 2018-12-06
Payer: COMMERCIAL

## 2018-12-06 VITALS
WEIGHT: 149 LBS | DIASTOLIC BLOOD PRESSURE: 60 MMHG | HEIGHT: 64 IN | SYSTOLIC BLOOD PRESSURE: 130 MMHG | BODY MASS INDEX: 25.44 KG/M2

## 2018-12-06 DIAGNOSIS — I10 ESSENTIAL HYPERTENSION: ICD-10-CM

## 2018-12-06 DIAGNOSIS — R42 VERTIGO: ICD-10-CM

## 2018-12-06 DIAGNOSIS — G89.29 CHRONIC BILATERAL LOW BACK PAIN WITHOUT SCIATICA: ICD-10-CM

## 2018-12-06 DIAGNOSIS — G43.009 MIGRAINE WITHOUT AURA AND WITHOUT STATUS MIGRAINOSUS, NOT INTRACTABLE: ICD-10-CM

## 2018-12-06 DIAGNOSIS — Z13.220 SCREENING FOR HYPERLIPIDEMIA: Primary | ICD-10-CM

## 2018-12-06 DIAGNOSIS — M54.50 CHRONIC BILATERAL LOW BACK PAIN WITHOUT SCIATICA: ICD-10-CM

## 2018-12-06 DIAGNOSIS — E11.43 TYPE 2 DIABETES MELLITUS WITH DIABETIC AUTONOMIC NEUROPATHY, WITHOUT LONG-TERM CURRENT USE OF INSULIN (HCC): ICD-10-CM

## 2018-12-06 DIAGNOSIS — R09.81 SINUS CONGESTION: ICD-10-CM

## 2018-12-06 LAB — HBA1C MFR BLD: 6.3 %

## 2018-12-06 PROCEDURE — 83036 HEMOGLOBIN GLYCOSYLATED A1C: CPT | Performed by: INTERNAL MEDICINE

## 2018-12-06 PROCEDURE — 99214 OFFICE O/P EST MOD 30 MIN: CPT | Performed by: INTERNAL MEDICINE

## 2018-12-06 RX ORDER — FLUTICASONE PROPIONATE 50 MCG
2 SPRAY, SUSPENSION (ML) NASAL DAILY
Qty: 1 BOTTLE | Refills: 11 | Status: SHIPPED | OUTPATIENT
Start: 2018-12-06 | End: 2020-03-11

## 2018-12-06 ASSESSMENT — ENCOUNTER SYMPTOMS
EYE ITCHING: 0
EYE DISCHARGE: 0
CHOKING: 0
SORE THROAT: 0
BACK PAIN: 1
CHEST TIGHTNESS: 0
ABDOMINAL DISTENTION: 0
VOMITING: 0
FACIAL SWELLING: 0
ABDOMINAL PAIN: 0
SINUS PRESSURE: 0
BLOOD IN STOOL: 0
RHINORRHEA: 0
PHOTOPHOBIA: 0
COLOR CHANGE: 0
CONSTIPATION: 0
COUGH: 0
TROUBLE SWALLOWING: 0
EYE REDNESS: 0
STRIDOR: 0
SHORTNESS OF BREATH: 1
APNEA: 0
RECTAL PAIN: 0
NAUSEA: 1
DIARRHEA: 0
EYE PAIN: 0
ANAL BLEEDING: 0
VOICE CHANGE: 0
WHEEZING: 0

## 2018-12-06 ASSESSMENT — PATIENT HEALTH QUESTIONNAIRE - PHQ9
SUM OF ALL RESPONSES TO PHQ QUESTIONS 1-9: 2
2. FEELING DOWN, DEPRESSED OR HOPELESS: 1
1. LITTLE INTEREST OR PLEASURE IN DOING THINGS: 1
SUM OF ALL RESPONSES TO PHQ9 QUESTIONS 1 & 2: 2
SUM OF ALL RESPONSES TO PHQ QUESTIONS 1-9: 2

## 2018-12-06 NOTE — PROGRESS NOTES
Sexual activity: Not Currently     Partners: Male     Birth control/ protection: Post-menopausal     Other Topics Concern    None     Social History Narrative    None       Current Outpatient Prescriptions   Medication Sig Dispense Refill    fluticasone (FLONASE) 50 MCG/ACT nasal spray 2 sprays by Nasal route daily 1 Bottle 11    losartan (COZAAR) 100 MG tablet TAKE 1 TABLET EVERY DAY 90 tablet 3    Erenumab-aooe (AIMOVIG) 70 MG/ML SOAJ Inject into the skin      amLODIPine (NORVASC) 10 MG tablet Take 1 tablet by mouth daily 90 tablet 3    metoprolol succinate (TOPROL XL) 50 MG extended release tablet Take 1 tablet by mouth daily 90 tablet 3    rosuvastatin (CRESTOR) 10 MG tablet Take 1 tablet by mouth daily 90 tablet 3    omeprazole (PRILOSEC) 20 MG delayed release capsule Take 1 capsule by mouth Daily 90 capsule 3    clopidogrel (PLAVIX) 75 MG tablet Take 1 tablet by mouth daily 90 tablet 3    butalbital-acetaminophen-caffeine (FIORICET, ESGIC) -40 MG per tablet Take 1 tablet by mouth every 6 hours as needed for Headaches 30 tablet 1    gabapentin (NEURONTIN) 300 MG capsule Take 1 capsule by mouth 3 times daily. . (Patient taking differently: Take 300 mg by mouth daily. Georgana Boop ) 90 capsule 0    glimepiride (AMARYL) 2 MG tablet TAKE 1 TABLET EVERY MORNING BEFORE BREAKFAST 90 tablet 3    Cholecalciferol (VITAMIN D) 2000 UNITS CAPS capsule Take 2,000 Units by mouth daily      vitamin E 400 UNIT capsule Take 400 Units by mouth daily      methylcellulose (CITRUCEL) 500 MG TABS Take by mouth      CALCIUM CITRATE PO Take  by mouth daily.  budesonide-formoterol (SYMBICORT) 160-4.5 MCG/ACT AERO Inhale 2 puffs into the lungs 2 times daily 3 Inhaler 3     No current facility-administered medications for this visit. Review of Systems:    Review of Systems        Objective:     PhysicalExam:      Physical Exam   Constitutional: She is oriented to person, place, and time.  She appears well-developed Total Protein 7.1     CO2 31 mmol/L    Alb 4.3     Alkaline Phosphatase 61 U/L    Total Bilirubin 0.5 0.1 - 1.4 mg/dL    Gfr Calculated >60     Anion Gap 8 mmol/L   CBC Auto Differential   Result Value Ref Range    WBC 4.9 10^3/mL    RBC 4.51 10^6/µL    Hemoglobin 13.5 12.0 - 16.0 g/dL    Hematocrit 39.7 36 - 46 %    MCV 88 fL    MCH 29.9 pg    MCHC 33.9 g/dL    Platelets 836 K/µL    RDW 14.1 %    MPV 7.8 fL    Neutrophils % 67.2 %    Lymphocytes % 21.0 %    Monocytes % 9.1 %    Eosinophils % 2.1 %    Basophils % 0.6 %    Neutrophils # 3.3 /µL    Lymphocytes # 1.0 /µL    Monocytes # 0.4 /µL    Eosinophils # 0.1 /µL    Basophils # 0.0 /µL   Lipid Panel   Result Value Ref Range    Cholesterol, Total 251 mg/dL    HDL 67 35 - 70 mg/dL    LDL Calculated 158 0 - 160 mg/dL    Triglycerides 132 mg/dL    Chol/HDL Ratio 3.7     VLDL 26 mg/dL   Urinalysis   Result Value Ref Range    pH, UA 6.0 4.5 - 8.0    Clarity, UA Clear Clear    Protein, UA Negative Negative    Glucose, Ur Negative     Ketones, Urine Negative     Bilirubin, Urine Negative     Urobilinogen, Urine Normal     Nitrite, Urine Negative     Specific Gravity, Urine 1.023     Color, UA Yellow     Leukocyte Esterase, Urine Negative     Blood, Urine Negative    Microalbumin, Ur   Result Value Ref Range    Microalb, Ur 1.0     Creatinine, Ur 202.60 mg/dL   Vitamin D 25 Hydroxy   Result Value Ref Range    Vit D, 25-Hydroxy      Vitamin D3,25 Hydroxy 41.1     Vitamin D2, 25 Hydroxy       No results found. Assessment:      Diagnosis Orders   1. Screening for hyperlipidemia  Lipid Panel recherck    Lipid Panel   2. Type 2 diabetes mellitus with diabetic autonomic neuropathy, without long-term current use of insulin (HCC)   DIABETES FOOT EXAM  6.3  bnl     Microalbumin, Ur    POCT glycosylated hemoglobin (Hb A1C)    CBC Auto Differential    Comprehensive Metabolic Panel     DIABETES FOOT EXAM    Microalbumin, Ur   3. Essential hypertension     4.  Vertigo Stable cont same   5. Chronic bilateral low back pain without sciatica    stable   6. Migraine without aura and without status migrainosus, not intractable    Stable cont prn rx     7. Sinus congestion  Add flonase         Plan:     Orders Placed This Encounter   Procedures    Microalbumin, Ur     Standing Status:   Future     Standing Expiration Date:   2019    Lipid Panel     Standing Status:   Future     Standing Expiration Date:   2019     Order Specific Question:   Is Patient Fasting?/# of Hours     Answer:   10-12 hours    CBC Auto Differential     Standing Status:   Future     Standing Expiration Date:   2019    Comprehensive Metabolic Panel     Standing Status:   Future     Standing Expiration Date:   2019    Lipid Panel     Standing Status:   Future     Standing Expiration Date:   2019     Order Specific Question:   Is Patient Fasting?/# of Hours     Answer:   yes    Microalbumin, Ur     Standing Status:   Future     Standing Expiration Date:   2019    POCT glycosylated hemoglobin (Hb A1C)     DIABETES FOOT EXAM     DIABETES FOOT EXAM       Orders Placed This Encounter   Medications    fluticasone (FLONASE) 50 MCG/ACT nasal spray     Si sprays by Nasal route daily     Dispense:  1 Bottle     Refill:  11             Barbara Verde received counseling on the following healthy behaviors: nutrition, exercise and medication adherence  Reviewed prior labs and health maintenance. Continue current medications, diet and exercise. Discussed use, benefit, and side effects of prescribed medications. Barriers to medication compliance addressed. Patient given educational materials - see patient instructions. All patient questions answered. Patient voiced understanding. 1.  Patient given educational materials - see patient instructions  2. Discussed use, benefit, and side effects of prescribed medications. Barriers to medication compliance addressed.   All patient

## 2018-12-06 NOTE — PROGRESS NOTES
Flu vaccine (1) 09/01/2018    Diabetic foot exam  09/15/2018    A1C test (Diabetic or Prediabetic)  09/26/2018    Diabetic microalbuminuria test  09/26/2018    Lipid screen  09/26/2018    Potassium monitoring  09/26/2018    Creatinine monitoring  09/26/2018    Breast cancer screen  01/23/2019    Diabetic retinal exam  11/29/2019    Colon cancer screen colonoscopy  04/27/2025    DEXA (modify frequency per FRAX score)  Completed    Pneumococcal low/med risk  Completed

## 2018-12-07 DIAGNOSIS — E11.43 TYPE 2 DIABETES MELLITUS WITH DIABETIC AUTONOMIC NEUROPATHY, WITHOUT LONG-TERM CURRENT USE OF INSULIN (HCC): ICD-10-CM

## 2018-12-07 DIAGNOSIS — Z13.220 SCREENING FOR HYPERLIPIDEMIA: Primary | ICD-10-CM

## 2018-12-10 LAB
ALBUMIN SERPL-MCNC: 4.4 G/DL
ALP BLD-CCNC: 79 U/L
ALT SERPL-CCNC: 20 U/L
ANION GAP SERPL CALCULATED.3IONS-SCNC: 7 MMOL/L
AST SERPL-CCNC: 17 U/L
BASOPHILS ABSOLUTE: 0 /ΜL
BASOPHILS RELATIVE PERCENT: 0.7 %
BILIRUB SERPL-MCNC: 0.5 MG/DL (ref 0.1–1.4)
BUN BLDV-MCNC: 15 MG/DL
CALCIUM SERPL-MCNC: 9.4 MG/DL
CHLORIDE BLD-SCNC: 105 MMOL/L
CHOLESTEROL, TOTAL: 158 MG/DL
CHOLESTEROL/HDL RATIO: 2.5
CO2: 32 MMOL/L
CREAT SERPL-MCNC: 0.75 MG/DL
CREATININE URINE: 101.66 MG/DL
EOSINOPHILS ABSOLUTE: 0.1 /ΜL
EOSINOPHILS RELATIVE PERCENT: 2.1 %
GFR CALCULATED: >60
GLUCOSE BLD-MCNC: 115 MG/DL
HCT VFR BLD CALC: 38.9 % (ref 36–46)
HDLC SERPL-MCNC: 63 MG/DL (ref 35–70)
HEMOGLOBIN: 13.2 G/DL (ref 12–16)
LDL CHOLESTEROL CALCULATED: 76 MG/DL (ref 0–160)
LYMPHOCYTES ABSOLUTE: 1.3 /ΜL
LYMPHOCYTES RELATIVE PERCENT: 27.4 %
MCH RBC QN AUTO: 30.4 PG
MCHC RBC AUTO-ENTMCNC: 33.9 G/DL
MCV RBC AUTO: 90 FL
MICROALBUMIN/CREAT 24H UR: <0.7 MG/G{CREAT}
MONOCYTES ABSOLUTE: 0.5 /ΜL
MONOCYTES RELATIVE PERCENT: 10 %
NEUTROPHILS ABSOLUTE: 2.8 /ΜL
NEUTROPHILS RELATIVE PERCENT: 59.8 %
PDW BLD-RTO: 13.7 %
PLATELET # BLD: 279 K/ΜL
PMV BLD AUTO: 8 FL
POTASSIUM SERPL-SCNC: 3.9 MMOL/L
RBC # BLD: 4.34 10^6/ΜL
SODIUM BLD-SCNC: 144 MMOL/L
TOTAL PROTEIN: 6.8
TRIGL SERPL-MCNC: 93 MG/DL
VLDLC SERPL CALC-MCNC: 19 MG/DL
WBC # BLD: 4.7 10^3/ML

## 2018-12-13 ENCOUNTER — TELEPHONE (OUTPATIENT)
Dept: INTERNAL MEDICINE CLINIC | Age: 73
End: 2018-12-13

## 2018-12-14 NOTE — TELEPHONE ENCOUNTER
Pt called back regarding lab results- Writer could not find anything to tell her. Please call her back.

## 2018-12-20 DIAGNOSIS — Z13.220 SCREENING FOR HYPERLIPIDEMIA: ICD-10-CM

## 2018-12-20 DIAGNOSIS — E11.43 TYPE 2 DIABETES MELLITUS WITH DIABETIC AUTONOMIC NEUROPATHY, WITHOUT LONG-TERM CURRENT USE OF INSULIN (HCC): ICD-10-CM

## 2019-01-04 ENCOUNTER — TELEPHONE (OUTPATIENT)
Dept: INTERNAL MEDICINE CLINIC | Age: 74
End: 2019-01-04

## 2019-01-04 DIAGNOSIS — Z12.39 BREAST CANCER SCREENING, HIGH RISK PATIENT: Primary | ICD-10-CM

## 2019-02-07 DIAGNOSIS — Z12.39 BREAST CANCER SCREENING, HIGH RISK PATIENT: ICD-10-CM

## 2019-03-05 ENCOUNTER — OFFICE VISIT (OUTPATIENT)
Dept: INTERNAL MEDICINE CLINIC | Age: 74
End: 2019-03-05
Payer: COMMERCIAL

## 2019-03-05 VITALS
BODY MASS INDEX: 25.61 KG/M2 | SYSTOLIC BLOOD PRESSURE: 126 MMHG | HEIGHT: 64 IN | DIASTOLIC BLOOD PRESSURE: 80 MMHG | WEIGHT: 150 LBS

## 2019-03-05 DIAGNOSIS — R05.9 COUGH: ICD-10-CM

## 2019-03-05 DIAGNOSIS — E11.43 TYPE 2 DIABETES MELLITUS WITH DIABETIC AUTONOMIC NEUROPATHY, WITHOUT LONG-TERM CURRENT USE OF INSULIN (HCC): Primary | ICD-10-CM

## 2019-03-05 DIAGNOSIS — K31.84 GASTROPARESIS: ICD-10-CM

## 2019-03-05 DIAGNOSIS — K21.9 GASTROESOPHAGEAL REFLUX DISEASE WITHOUT ESOPHAGITIS: ICD-10-CM

## 2019-03-05 PROCEDURE — 99214 OFFICE O/P EST MOD 30 MIN: CPT | Performed by: INTERNAL MEDICINE

## 2019-03-05 ASSESSMENT — PATIENT HEALTH QUESTIONNAIRE - PHQ9
SUM OF ALL RESPONSES TO PHQ9 QUESTIONS 1 & 2: 2
2. FEELING DOWN, DEPRESSED OR HOPELESS: 1
1. LITTLE INTEREST OR PLEASURE IN DOING THINGS: 1
SUM OF ALL RESPONSES TO PHQ QUESTIONS 1-9: 2
SUM OF ALL RESPONSES TO PHQ QUESTIONS 1-9: 2

## 2019-03-05 ASSESSMENT — ENCOUNTER SYMPTOMS
RHINORRHEA: 0
VOMITING: 0
STRIDOR: 0
SINUS PRESSURE: 0
EYE REDNESS: 0
CONSTIPATION: 0
CHEST TIGHTNESS: 0
DIARRHEA: 0
CHOKING: 0
NAUSEA: 0
RECTAL PAIN: 0
VOICE CHANGE: 0
COLOR CHANGE: 1
COUGH: 0
FACIAL SWELLING: 0
EYE DISCHARGE: 0
BLOOD IN STOOL: 0
APNEA: 0
WHEEZING: 0
ABDOMINAL DISTENTION: 0
BACK PAIN: 1
SHORTNESS OF BREATH: 1
EYE PAIN: 0
ABDOMINAL PAIN: 0
TROUBLE SWALLOWING: 0
PHOTOPHOBIA: 0
SORE THROAT: 0
ANAL BLEEDING: 0
EYE ITCHING: 0

## 2019-04-02 ENCOUNTER — PROCEDURE VISIT (OUTPATIENT)
Dept: INTERNAL MEDICINE CLINIC | Age: 74
End: 2019-04-02
Payer: COMMERCIAL

## 2019-04-02 VITALS
WEIGHT: 149 LBS | BODY MASS INDEX: 25.56 KG/M2 | SYSTOLIC BLOOD PRESSURE: 138 MMHG | DIASTOLIC BLOOD PRESSURE: 78 MMHG | RESPIRATION RATE: 14 BRPM

## 2019-04-02 DIAGNOSIS — L98.8 SKIN LESION OF BREAST: Primary | ICD-10-CM

## 2019-04-02 PROCEDURE — 17110 DESTRUCTION B9 LES UP TO 14: CPT | Performed by: INTERNAL MEDICINE

## 2019-04-02 RX ORDER — GLIMEPIRIDE 2 MG/1
TABLET ORAL
Qty: 90 TABLET | Refills: 2 | Status: SHIPPED | OUTPATIENT
Start: 2019-04-02 | End: 2019-09-28 | Stop reason: SDUPTHER

## 2019-04-02 NOTE — PROGRESS NOTES
Subjective:      Visit Information    Have you changed or started any medications since your last visit including any over-the-counter medicines, vitamins, or herbal medicines? no   Have you stopped taking any of your medications? Is so, why? -  no  Are you having any side effects from any of your medications? - no    Have you seen any other physician or provider since your last visit?  no   Have you had any other diagnostic tests since your last visit?  no   Have you been seen in the emergency room and/or had an admission in a hospital since we last saw you?  no   Have you had your routine dental cleaning in the past 6 months?  no     Do you have an active MyChart account? If no, what is the barrier? Yes    Patient Care Team:  Pati Salinas MD as PCP - General (Internal Medicine)  Davey Ellington DO as Consulting Physician (Obstetrics & Gynecology)    Medical History Review  Past Medical, Family, and Social History reviewed and does contribute to the patient presenting condition    Health Maintenance   Topic Date Due    DTaP/Tdap/Td vaccine (1 - Tdap) 02/17/1963    Shingles Vaccine (1 of 2) 02/17/1994    Flu vaccine (Season Ended) 09/01/2019    Diabetic retinal exam  11/29/2019    Diabetic foot exam  12/06/2019    A1C test (Diabetic or Prediabetic)  12/06/2019    Lipid screen  12/10/2019    Potassium monitoring  12/10/2019    Creatinine monitoring  12/10/2019    Colon cancer screen colonoscopy  04/27/2025    DEXA (modify frequency per FRAX score)  Completed    Pneumococcal 65+ years Vaccine  Completed       Destruction of Benign Lesions Procedure Note    Lesions: Right Upper Breast    Indication 1: irritated  Indication 2: enlarging    Procedure: The lesions were cleansed with alcohol and 2% lidocaine with epinephrine was used for local anaesthesia. 35 watt cautery was used to remove the lesions. Hemostasis was obtained using a pressure dressing with good results.   The patient tolerated the procedure well without complications. The patient was instructed on wound care and scheduled for follow up in 1-2 weeks.

## 2019-04-04 PROBLEM — R05.9 COUGH: Status: RESOLVED | Noted: 2019-03-05 | Resolved: 2019-04-04

## 2019-04-16 ENCOUNTER — OFFICE VISIT (OUTPATIENT)
Dept: INTERNAL MEDICINE CLINIC | Age: 74
End: 2019-04-16
Payer: COMMERCIAL

## 2019-04-16 VITALS
SYSTOLIC BLOOD PRESSURE: 128 MMHG | HEART RATE: 78 BPM | HEIGHT: 64 IN | BODY MASS INDEX: 25.78 KG/M2 | WEIGHT: 151 LBS | OXYGEN SATURATION: 98 % | DIASTOLIC BLOOD PRESSURE: 70 MMHG

## 2019-04-16 DIAGNOSIS — Z00.00 ROUTINE GENERAL MEDICAL EXAMINATION AT A HEALTH CARE FACILITY: Primary | ICD-10-CM

## 2019-04-16 PROCEDURE — G0438 PPPS, INITIAL VISIT: HCPCS | Performed by: NURSE PRACTITIONER

## 2019-04-16 ASSESSMENT — PATIENT HEALTH QUESTIONNAIRE - PHQ9
SUM OF ALL RESPONSES TO PHQ QUESTIONS 1-9: 0
SUM OF ALL RESPONSES TO PHQ QUESTIONS 1-9: 0

## 2019-04-16 ASSESSMENT — LIFESTYLE VARIABLES: HOW OFTEN DO YOU HAVE A DRINK CONTAINING ALCOHOL: 0

## 2019-04-16 ASSESSMENT — ANXIETY QUESTIONNAIRES: GAD7 TOTAL SCORE: 2

## 2019-04-16 NOTE — PATIENT INSTRUCTIONS
healthy. Your doctor has checked your overall health and may have suggested ways to take good care of yourself. He or she also may have recommended tests. At home, you can help prevent illness with healthy eating, regular exercise, and other steps. Follow-up care is a key part of your treatment and safety. Be sure to make and go to all appointments, and call your doctor if you are having problems. It's also a good idea to know your test results and keep a list of the medicines you take. How can you care for yourself at home? Reach and stay at a healthy weight. This will lower your risk for many problems, such as obesity, diabetes, heart disease, and high blood pressure. Get at least 30 minutes of exercise on most days of the week. Walking is a good choice. You also may want to do other activities, such as running, swimming, cycling, or playing tennis or team sports. Do not smoke. Smoking can make health problems worse. If you need help quitting, talk to your doctor about stop-smoking programs and medicines. These can increase your chances of quitting for good. Protect your skin from too much sun. When you're outdoors from 10 a.m. to 4 p.m., stay in the shade or cover up with clothing and a hat with a wide brim. Wear sunglasses that block UV rays. Even when it's cloudy, put broad-spectrum sunscreen (SPF 30 or higher) on any exposed skin. See a dentist one or two times a year for checkups and to have your teeth cleaned. Wear a seat belt in the car. Limit alcohol to 2 drinks a day for men and 1 drink a day for women. Too much alcohol can cause health problems. Follow your doctor's advice about when to have certain tests. These tests can spot problems early. For men and women  Cholesterol. Your doctor will tell you how often to have this done based on your overall health and other things that can increase your risk for heart attack and stroke. Blood pressure.  Have your blood pressure checked during a routine doctor visit. Your doctor will tell you how often to check your blood pressure based on your age, your blood pressure results, and other factors. Diabetes. Ask your doctor whether you should have tests for diabetes. Vision. Experts recommend that you have yearly exams for glaucoma and other age-related eye problems. Hearing. Tell your doctor if you notice any change in your hearing. You can have tests to find out how well you hear. Colon cancer tests. Keep having colon cancer tests as your doctor recommends. You can have one of several types of tests. Heart attack and stroke risk. At least every 4 to 6 years, you should have your risk for heart attack and stroke assessed. Your doctor uses factors such as your age, blood pressure, cholesterol, and whether you smoke or have diabetes to show what your risk for a heart attack or stroke is over the next 10 years. Osteoporosis. Talk to your doctor about whether you should have a bone density test to find out whether you have thinning bones. Also ask your doctor about whether you should take calcium and vitamin D supplements. For women  Pap test and pelvic exam. You may no longer need a Pap test. Talk with your doctor about whether to stop or continue to have Pap tests. Breast exam and mammogram. Ask how often you should have a mammogram, which is an X-ray of your breasts. A mammogram can spot breast cancer before it can be felt and when it is easiest to treat. Thyroid disease. Talk to your doctor about whether to have your thyroid checked as part of a regular physical exam. Women have an increased chance of a thyroid problem. For men  Prostate exam. Talk to your doctor about whether you should have a blood test (called a PSA test) for prostate cancer. Experts disagree on whether men should have this test. Some experts recommend that you discuss the benefits and risks of the test with your doctor. Abdominal aortic aneurysm.  Ask your doctor whether you should have a test to check for an aneurysm. You may need a test if you ever smoked or if your parent, brother, sister, or child has had an aneurysm. When should you call for help? Watch closely for changes in your health, and be sure to contact your doctor if you have any problems or symptoms that concern you. Where can you learn more? Go to https://chpepiceweb.[employer]. org and sign in to your [employer] account. Enter J498 in the [employer] box to learn more about \"Well Visit, Over 65: Care Instructions. \"     If you do not have an account, please click on the \"Sign Up Now\" link. Current as of: March 28, 2018  Content Version: 11.9  © 8325-1134 [employer]. Care instructions adapted under license by [facility] ([facility]). If you have questions about a medical condition or this instruction, always ask your healthcare professional. [place] [address] any warranty or liability for your use of this information. Patient Education        Neck Arthritis: Exercises  Your Care Instructions  Here are some examples of typical rehabilitation exercises for your condition. Start each exercise slowly. Ease off the exercise if you start to have pain. Your doctor or physical therapist will tell you when you can start these exercises and which ones will work best for you. How to do the exercises  Neck stretches to the side    This stretch works best if you keep your shoulder down as you lean away from it. To help you remember to do this, start by relaxing your shoulders and lightly holding on to your thighs or your chair. Tilt your head toward your shoulder and hold for 15 to 30 seconds. Let the weight of your head stretch your muscles. Repeat 2 to 4 times toward each shoulder. Chin tuck    Lie on the floor with a rolled-up towel under your neck. Your head should be touching the floor.   Slowly bring your chin toward your chest.  Hold for a count of 6, and then relax for up to 10 seconds. Repeat 8 to 12 times. Active cervical rotation    Sit in a firm chair, or stand up straight. Keeping your chin level, turn your head to the right, and hold for 15 to 30 seconds. Turn your head to the left and hold for 15 to 30 seconds. Repeat 2 to 4 times to each side. Shoulder blade squeeze    While standing, squeeze your shoulder blades together. Do not raise your shoulders up as you are squeezing. Hold for 6 seconds. Repeat 8 to 12 times. Shoulder rolls    Sit comfortably with your feet shoulder-width apart. You can also do this exercise standing up. Roll your shoulders up, then back, and then down in a smooth, circular motion. Repeat 2 to 4 times. Follow-up care is a key part of your treatment and safety. Be sure to make and go to all appointments, and call your doctor if you are having problems. It's also a good idea to know your test results and keep a list of the medicines you take. Where can you learn more? Go to https://Elco.[employer]. org and sign in to your [employer] account. Enter Q327 in the [employer] box to learn more about \"Neck Arthritis: Exercises. \"     If you do not have an account, please click on the \"Sign Up Now\" link. Current as of: September 20, 2018  Content Version: 11.9  © 6634-6790 [employer]. Care instructions adapted under license by [facility] ([facility]). If you have questions about a medical condition or this instruction, always ask your healthcare professional. [place] [address] any warranty or liability for your use of this information.

## 2019-04-16 NOTE — PROGRESS NOTES
Take 1 tablet by mouth every 6 hours as needed for Headaches Yes Kim Figueroa MD   Cholecalciferol (VITAMIN D) 2000 UNITS CAPS capsule Take 2,000 Units by mouth daily Yes Historical Provider, MD   vitamin E 400 UNIT capsule Take 400 Units by mouth daily Yes Historical Provider, MD   methylcellulose (CITRUCEL) 500 MG TABS Take by mouth Yes Historical Provider, MD   CALCIUM CITRATE PO Take  by mouth daily. Yes Historical Provider, MD   Erenumab-aooe (AIMOVIG) 79 MG/ML SOAJ Inject into the skin  Historical Provider, MD   gabapentin (NEURONTIN) 300 MG capsule Take 1 capsule by mouth 3 times daily. .  Patient taking differently: Take 300 mg by mouth daily.  .  Kim Figueroa MD     Past Medical History:   Diagnosis Date    Arthritis     Asthma     Cerebral aneurysm, nonruptured     COPD (chronic obstructive pulmonary disease) ([address]     DDD (degenerative disc disease), lumbar 4/18/2017    Diabetes ([address]     GERD (gastroesophageal reflux disease)     Herpes zoster with other nervous system complications(053.19)     Hypertension     Lumbago     Migraine     Myoclonus     Neck pain     Osteoporosis     Other and unspecified hyperlipidemia     Palpitations     COMPA (renal artery stenosis) ([address] 7/9/2015    Type 2 diabetes mellitus with autonomic neuropathy ([address] 9/9/2015    Vision abnormalities     glasses    Vitamin D deficiency      Past Surgical History:   Procedure Laterality Date    COLONOSCOPY  2015    CYST REMOVAL  11/2016    vaginal     DILATION AND CURETTAGE      DILATION AND CURETTAGE  10/5/15    w/ hysteroscopy    DILATION AND CURETTAGE OF UTERUS      FRACTURE SURGERY Left     HYSTEROSCOPY  10/5/15    w/ d&c    LAPAROSCOPY      UPPER GASTROINTESTINAL ENDOSCOPY       Family History   Problem Relation Age of Onset    Cancer Mother         BRAIN    Cancer Father         PANCREATIC    Cancer Sister         SKIN    Heart Disease Maternal Aunt        CareTeam (Including outside providers/suppliers regularly involved in providing care):   Patient Care Team:  Kaye Velarde MD as PCP - General (Internal Medicine)  Vinny Dyson DO as Consulting Physician (Obstetrics & Gynecology)    [facility] Readings from Last 3 Encounters:   04/16/19 151 lb (68.5 kg)   04/02/19 149 lb (67.6 kg)   03/05/19 150 lb (68 kg)     Vitals:    04/16/19 1335   BP: 128/70   Site: Right Upper Arm   Position: Sitting   Cuff Size: Medium Adult   Pulse: 78   SpO2: 98%   Weight: 151 lb (68.5 kg)   Height: 5' 4.02\" (1.626 m)     Body mass index is 25.91 kg/m². Based upon direct observation of the patient, evaluation of cognition reveals recent and remote memory intact. General Appearance: alert and oriented to person, place and time, well developed and well- nourished, in no acute distress  Skin: warm and dry, no rash, slight erythema to left chest (recent skin lesion removal)  Head: normocephalic and atraumatic  Eyes: pupils equal, round, and reactive to light, extraocular eye movements intact, conjunctivae normal  ENT: tympanic membrane, external ear and ear canal normal bilaterally, nose without deformity, nasal mucosa and turbinates normal without polyps  Neck: supple and non-tender without mass, no thyromegaly or thyroid nodules, no cervical lymphadenopathy  Pulmonary/Chest: clear to auscultation bilaterally- no wheezes, rales or rhonchi, normal air movement, no respiratory distress  Cardiovascular: normal rate, regular rhythm,  no murmurs,  distal pulses intact, no carotid bruits  Abdomen: soft, non-tender, non-distended, normal bowel sounds, no masses or organomegaly  Extremities: no edema  Musculoskeletal: normal range of motion, no joint swelling, deformity or tenderness  Neurologic: reflexes normal and symmetric, no cranial nerve deficit, gait, coordination and speech normal    Patient's complete Health Risk Assessment and screening values have been reviewed and are found in Flowsheets.  The following Yes  Are your doorways, halls and stairs free of clutter?: Yes  Do you always fasten your seatbelt when you are in a car?: Yes  Safety Interventions:  · Patient declines any further evaluation/treatment for this issue    ADL:  ADLs  In the past 7 days, did you need help from others to perform any of the following everyday activities? Eating, dressing, grooming, bathing, toileting, or walking/balance?: None  In the past 7 days, did you need help from others to take care of any of the following? Laundry, housekeeping, banking/finances, shopping, telephone use, food preparation, transportation, or taking medications?: (!) None, Transportation, Banking/Finances, Shopping  ADL Interventions:  · Patient declines any further evaluation/treatment for this issue    Personalized Preventive Plan   Current Health Maintenance Status  Immunization History   Administered Date(s) Administered    Influenza Virus Vaccine 10/08/2015    Influenza, High Dose (Fluzone 65 yrs and older) 09/27/2017    Pneumococcal 13-valent Conjugate ([id number]) 01/18/2016    Pneumococcal Polysaccharide ([id number]) 10/11/2013        Health Maintenance   Topic Date Due    DTaP/Tdap/Td vaccine (1 - Tdap) 02/17/1963    Shingles Vaccine (1 of 2) 02/17/1994    Flu vaccine (Season Ended) 09/01/2019    Diabetic retinal exam  11/29/2019    Diabetic foot exam  12/06/2019    A1C test (Diabetic or Prediabetic)  12/06/2019    Lipid screen  12/10/2019    Potassium monitoring  12/10/2019    Creatinine monitoring  12/10/2019    Colon cancer screen colonoscopy  04/27/2025    DEXA (modify frequency per FRAX score)  Completed    Pneumococcal 65+ years Vaccine  Completed     Recommendations for Preventive Services Due: see orders and patient instructions/AVS.    Health Maintenance reviewed - Patient to check with insurance/get TDAP at pharmacy. Education provided regarding shingles Vaccine recommendations.     Recommended screening schedule for the next 5-10 years is provided to the patient in written form: see Patient Instructions/AVS.

## 2019-06-06 ENCOUNTER — OFFICE VISIT (OUTPATIENT)
Dept: INTERNAL MEDICINE CLINIC | Age: 74
End: 2019-06-06
Payer: COMMERCIAL

## 2019-06-06 VITALS
HEIGHT: 64 IN | DIASTOLIC BLOOD PRESSURE: 64 MMHG | WEIGHT: 153 LBS | SYSTOLIC BLOOD PRESSURE: 122 MMHG | BODY MASS INDEX: 26.12 KG/M2

## 2019-06-06 DIAGNOSIS — J44.1 CHRONIC OBSTRUCTIVE PULMONARY DISEASE WITH ACUTE EXACERBATION (HCC): ICD-10-CM

## 2019-06-06 DIAGNOSIS — J30.89 SEASONAL ALLERGIC RHINITIS DUE TO OTHER ALLERGIC TRIGGER: ICD-10-CM

## 2019-06-06 DIAGNOSIS — E11.43 TYPE 2 DIABETES MELLITUS WITH DIABETIC AUTONOMIC NEUROPATHY, WITHOUT LONG-TERM CURRENT USE OF INSULIN (HCC): Primary | ICD-10-CM

## 2019-06-06 DIAGNOSIS — I10 ESSENTIAL HYPERTENSION: ICD-10-CM

## 2019-06-06 DIAGNOSIS — R21 RASH: ICD-10-CM

## 2019-06-06 DIAGNOSIS — R25.2 LEG CRAMPS: ICD-10-CM

## 2019-06-06 PROBLEM — J30.2 SEASONAL ALLERGIC RHINITIS: Status: ACTIVE | Noted: 2019-06-06

## 2019-06-06 LAB
AVERAGE GLUCOSE: 154
CALCIUM SERPL-MCNC: 9.3 MG/DL
HBA1C MFR BLD: 7 %
MAGNESIUM: 2 MG/DL
POTASSIUM (K+): 3.7

## 2019-06-06 PROCEDURE — 99214 OFFICE O/P EST MOD 30 MIN: CPT | Performed by: INTERNAL MEDICINE

## 2019-06-06 RX ORDER — METHYLPREDNISOLONE 4 MG/1
TABLET ORAL
Qty: 1 KIT | Refills: 0 | Status: SHIPPED | OUTPATIENT
Start: 2019-06-06 | End: 2019-06-12

## 2019-06-06 RX ORDER — FEXOFENADINE HCL 180 MG/1
180 TABLET ORAL DAILY
Qty: 30 TABLET | Refills: 0 | Status: SHIPPED | OUTPATIENT
Start: 2019-06-06 | End: 2019-07-06

## 2019-06-06 RX ORDER — AZITHROMYCIN 250 MG/1
TABLET, FILM COATED ORAL
Qty: 1 PACKET | Refills: 0 | Status: SHIPPED | OUTPATIENT
Start: 2019-06-06 | End: 2019-06-16

## 2019-06-06 ASSESSMENT — ENCOUNTER SYMPTOMS
STRIDOR: 0
CHOKING: 0
ABDOMINAL PAIN: 0
DIARRHEA: 0
APNEA: 0
VOICE CHANGE: 0
RHINORRHEA: 0
ABDOMINAL DISTENTION: 0
ANAL BLEEDING: 0
EYE ITCHING: 0
BACK PAIN: 0
EYE PAIN: 0
COUGH: 0
BLOOD IN STOOL: 0
SHORTNESS OF BREATH: 0
SORE THROAT: 0
FACIAL SWELLING: 0
EYE REDNESS: 0
COLOR CHANGE: 0
CHEST TIGHTNESS: 0
TROUBLE SWALLOWING: 0
RECTAL PAIN: 0
VOMITING: 0
EYE DISCHARGE: 0
PHOTOPHOBIA: 0
CONSTIPATION: 0
NAUSEA: 0
WHEEZING: 0
SINUS PRESSURE: 0

## 2019-06-06 NOTE — PROGRESS NOTES
Visit Information    Have you changed or started any medications since your last visit including any over-the-counter medicines, vitamins, or herbal medicines? no   Are you having any side effects from any of your medications? -  no  Have you stopped taking any of your medications? Is so, why? -  no    Have you seen any other physician or provider since your last visit? No  Have you had any other diagnostic tests since your last visit? Yes - Records Obtained  Have you been seen in the emergency room and/or had an admission to a hospital since we last saw you? No  Have you had your routine dental cleaning in the past 6 months? no    Have you activated your [employer] account? If not, what are your barriers?  No:      Patient Care Team:  Leticia Fox MD as PCP - General (Internal Medicine)  Leticia Fox MD as PCP - [facility]  Mary Jane Pimentel DO as Consulting Physician (Obstetrics & Gynecology)    Medical History Review  Past Medical, Family, and Social History reviewed and does not contribute to the patient presenting condition    Health Maintenance   Topic Date Due    DTaP/Tdap/Td vaccine (1 - Tdap) 02/17/1963    Shingles Vaccine (1 of 2) 02/17/1994    Flu vaccine (Season Ended) 09/01/2019    Diabetic retinal exam  11/29/2019    Diabetic foot exam  12/06/2019    A1C test (Diabetic or Prediabetic)  12/06/2019    Lipid screen  12/10/2019    Potassium monitoring  12/10/2019    Creatinine monitoring  12/10/2019    Colon cancer screen colonoscopy  04/27/2025    DEXA (modify frequency per FRAX score)  Completed    Pneumococcal 65+ years Vaccine  Completed

## 2019-06-06 NOTE — PROGRESS NOTES
Worry: None     Inability: None    Transportation needs:     Medical: None     Non-medical: None   Tobacco Use    Smoking status: Former Smoker     Packs/day: 0.25     Years: 30.00     Pack years: 7.50     Types: Cigarettes     Start date: 1955     Last attempt to quit: 1990     Years since quittin.8    Smokeless tobacco: Never Used   Substance and Sexual Activity    Alcohol use: No     Alcohol/week: 0.0 oz    Drug use: No    Sexual activity: Not Currently     Partners: Male     Birth control/protection: Post-menopausal   Lifestyle    Physical activity:     Days per week: None     Minutes per session: None    Stress: None   Relationships    Social connections:     Talks on phone: None     Gets together: None     Attends Sabianism service: None     Active member of club or organization: None     Attends meetings of clubs or organizations: None     Relationship status: None    Intimate partner violence:     Fear of current or ex partner: None     Emotionally abused: None     Physically abused: None     Forced sexual activity: None   Other Topics Concern    None   Social History Narrative    None       Current Outpatient Medications   Medication Sig Dispense Refill    methylPREDNISolone (MEDROL, APOLONIA,) 4 MG tablet Take as directed 1 kit 0    azithromycin (ZITHROMAX) 250 MG tablet 2 tablets now then 1 daily until gone.  1 packet 0    fexofenadine (ALLEGRA) 180 MG tablet Take 1 tablet by mouth daily 30 tablet 0    glimepiride (AMARYL) 2 MG tablet TAKE ONE TABLET BY MOUTH BEFORE BREAKFAST  90 tablet 2    fluticasone (FLONASE) 50 MCG/ACT nasal spray 2 sprays by Nasal route daily 1 Bottle 11    losartan (COZAAR) 100 MG tablet TAKE 1 TABLET EVERY DAY 90 tablet 3    budesonide-formoterol (SYMBICORT) 160-4.5 MCG/ACT AERO Inhale 2 puffs into the lungs 2 times daily 3 Inhaler 3    amLODIPine (NORVASC) 10 MG tablet Take 1 tablet by mouth daily 90 tablet 3    metoprolol succinate (TOPROL XL) 50 MG extended release tablet Take 1 tablet by mouth daily 90 tablet 3    rosuvastatin (CRESTOR) 10 MG tablet Take 1 tablet by mouth daily 90 tablet 3    omeprazole (PRILOSEC) 20 MG delayed release capsule Take 1 capsule by mouth Daily 90 capsule 3    clopidogrel (PLAVIX) 75 MG tablet Take 1 tablet by mouth daily 90 tablet 3    butalbital-acetaminophen-caffeine (FIORICET, ESGIC) -40 MG per tablet Take 1 tablet by mouth every 6 hours as needed for Headaches 30 tablet 1    Cholecalciferol (VITAMIN D) 2000 UNITS CAPS capsule Take 2,000 Units by mouth daily      vitamin E 400 UNIT capsule Take 400 Units by mouth daily      methylcellulose (CITRUCEL) 500 MG TABS Take by mouth      CALCIUM CITRATE PO Take  by mouth daily. No current facility-administered medications for this visit. Review of Systems:    Review of Systems   Constitutional: Negative for activity change, appetite change, chills, diaphoresis, fatigue and fever. HENT: Negative for congestion, dental problem, drooling, ear discharge, ear pain, facial swelling, hearing loss, mouth sores, nosebleeds, postnasal drip, rhinorrhea, sinus pressure, sneezing, sore throat, tinnitus, trouble swallowing and voice change. Eyes: Negative for photophobia, pain, discharge, redness, itching and visual disturbance. Respiratory: Negative for apnea, cough, choking, chest tightness, shortness of breath, wheezing and stridor. Cardiovascular: Negative for chest pain, palpitations and leg swelling. Gastrointestinal: Negative for abdominal distention, abdominal pain, anal bleeding, blood in stool, constipation, diarrhea, nausea, rectal pain and vomiting. Endocrine: Negative for cold intolerance, heat intolerance, polydipsia, polyphagia and polyuria.    Genitourinary: Negative for decreased urine volume, difficulty urinating, dyspareunia, dysuria, enuresis, flank pain, frequency, genital sores, hematuria, menstrual problem, pelvic pain, urgency, vaginal bleeding and vaginal discharge. Musculoskeletal: Positive for arthralgias and myalgias. Negative for back pain, gait problem, joint swelling, neck pain and neck stiffness. Skin: Positive for rash. Negative for color change, pallor and wound. Neurological: Negative for dizziness, tremors, seizures, syncope, facial asymmetry, speech difficulty, weakness, light-headedness, numbness and headaches. Hematological: Negative for adenopathy. Does not bruise/bleed easily. Psychiatric/Behavioral: Positive for sleep disturbance. Negative for agitation, behavioral problems, confusion, decreased concentration and dysphoric mood. The patient is nervous/anxious. Objective:     PhysicalExam:      Physical Exam   Constitutional: She is oriented to person, place, and time. She appears well-developed and well-nourished. No distress. HENT:   Head: Normocephalic and atraumatic. Right Ear: External ear normal.   Left Ear: External ear normal.   Nose: Mucosal edema and rhinorrhea present. Mouth/Throat: Oropharyngeal exudate present. Eyes: Pupils are equal, round, and reactive to light. Conjunctivae and EOM are normal. Right eye exhibits no discharge. Left eye exhibits no discharge. No scleral icterus. Neck: Normal range of motion. Neck supple. No JVD present. No tracheal deviation present. No thyromegaly present. Cardiovascular: Normal rate, regular rhythm, normal heart sounds and intact distal pulses. Exam reveals no gallop and no friction rub. No murmur heard. Pulmonary/Chest: Effort normal and breath sounds normal. No respiratory distress. She has no wheezes. She has no rales. She exhibits no tenderness. Abdominal: Soft. Bowel sounds are normal. She exhibits no distension and no mass. There is no tenderness. There is no rebound and no guarding. Genitourinary: Rectal exam shows guaiac negative stool. No vaginal discharge found. Musculoskeletal: She exhibits no edema or tenderness. Lymphadenopathy:     She has no cervical adenopathy. Neurological: She is alert and oriented to person, place, and time. She has normal reflexes. She displays normal reflexes. No cranial nerve deficit. She exhibits normal muscle tone. Coordination normal.   Skin: Skin is warm and dry. No rash noted. She is not diaphoretic. No erythema. No pallor. Rash face / neck   Psychiatric: Her behavior is normal. Judgment and thought content normal. Her mood appears anxious. Results for orders placed or performed in visit on 12/20/18   CBC With Auto Differential   Result Value Ref Range    WBC 4.7 10^3/mL    RBC 4.34 10^6/µL    Hemoglobin 13.2 12.0 - 16.0 g/dL    Hematocrit 38.9 36 - 46 %    MCV 90 fL    MCH 30.4 pg    MCHC 33.9 g/dL    Platelets 116 K/µL    RDW 13.7 %    MPV 8.0 fL    Neutrophils % 59.8 %    Lymphocytes % 27.4 %    Monocytes % 10.0 %    Eosinophils % 2.1 %    Basophils % 0.7 %    Neutrophils # 2.8 /µL    Lymphocytes # 1.3 /µL    Monocytes # 0.5 /µL    Eosinophils # 0.1 /µL    Basophils # 0.0 /µL   Microalbumin, Ur   Result Value Ref Range    Microalb, Ur <0.7     Creatinine, Ur 101.66 mg/dL   Comprehensive Metabolic Panel   Result Value Ref Range    Sodium 144 mmol/L    Chloride 105 mmol/L    Potassium 3.9 mmol/L    BUN 15 mg/dL    CREATININE 0.75     Glucose 115 mg/dL    AST 17 U/L    ALT 20 U/L    Calcium 9.4 mg/dL    Total Protein 6.8     CO2 32 mmol/L    Alb 4.4     Alkaline Phosphatase 79 U/L    Total Bilirubin 0.5 0.1 - 1.4 mg/dL    Gfr Calculated >60     Anion Gap 7 mmol/L   Lipid Panel   Result Value Ref Range    Cholesterol, Total 158 mg/dL    HDL 63 35 - 70 mg/dL    LDL Calculated 76 0 - 160 mg/dL    Triglycerides 93 mg/dL    Chol/HDL Ratio 2.5     VLDL 19 mg/dL     No results found. Assessment:      Diagnosis Orders   1. Type 2 diabetes mellitus with diabetic autonomic neuropathy, without long-term current use of insulin (HCC)  Hemoglobin A1C at goal cont same   2.  Rash   medrol face  ? Cosmetics    3. Leg cramps  Calcium use otb theramax    Creatine Kinase    Potassium    Magnesium   4. Essential hypertension   controlled    5. Chronic obstructive pulmonary disease with acute exacerbation (HCC)    baseline      urti   Lt lyphadenopathy  zpack    allergic rhinitis   Allegra/ maedrol     has flonase  Plan:     Orders Placed This Encounter   Procedures    Hemoglobin A1C     Standing Status:   Future     Standing Expiration Date:   2020    Calcium     Standing Status:   Future     Standing Expiration Date:   2020    Creatine Kinase     Standing Status:   Future     Standing Expiration Date:   2020    Potassium     Standing Status:   Future     Standing Expiration Date:   2020    Magnesium     Standing Status:   Future     Standing Expiration Date:   2020       Orders Placed This Encounter   Medications    methylPREDNISolone (MEDROL, APOLONIA,) 4 MG tablet     Sig: Take as directed     Dispense:  1 kit     Refill:  0    azithromycin (ZITHROMAX) 250 MG tablet     Si tablets now then 1 daily until gone. Dispense:  1 packet     Refill:  0    fexofenadine (ALLEGRA) 180 MG tablet     Sig: Take 1 tablet by mouth daily     Dispense:  30 tablet     Refill:  0             Marisol received counseling on the following healthy behaviors: nutrition, exercise and medication adherence  Reviewed prior labs and health maintenance. Continue current medications, diet and exercise. Discussed use, benefit, and side effects of prescribed medications. Barriers to medication compliance addressed. Patient given educational materials - see patient instructions. All patient questions answered. Patient voiced understanding. 1.  Patient given educational materials - see patient instructions  2. Discussed use, benefit, and side effects of prescribed medications. Barriers to medication compliance addressed. All patient questions answered. Pt voiced understanding.    3.  Reviewed prior labs and health maintenance  4. Continue current medications, diet and exercise.   5.   6 weeks

## 2019-06-07 DIAGNOSIS — E11.43 TYPE 2 DIABETES MELLITUS WITH DIABETIC AUTONOMIC NEUROPATHY, WITHOUT LONG-TERM CURRENT USE OF INSULIN (HCC): ICD-10-CM

## 2019-06-07 DIAGNOSIS — R25.2 LEG CRAMPS: ICD-10-CM

## 2019-06-25 ENCOUNTER — TELEPHONE (OUTPATIENT)
Dept: INTERNAL MEDICINE CLINIC | Age: 74
End: 2019-06-25

## 2019-07-25 ENCOUNTER — OFFICE VISIT (OUTPATIENT)
Dept: INTERNAL MEDICINE CLINIC | Age: 74
End: 2019-07-25
Payer: COMMERCIAL

## 2019-07-25 VITALS
HEART RATE: 63 BPM | WEIGHT: 154 LBS | SYSTOLIC BLOOD PRESSURE: 132 MMHG | DIASTOLIC BLOOD PRESSURE: 82 MMHG | BODY MASS INDEX: 26.29 KG/M2 | HEIGHT: 64 IN | OXYGEN SATURATION: 98 %

## 2019-07-25 DIAGNOSIS — J30.89 SEASONAL ALLERGIC RHINITIS DUE TO OTHER ALLERGIC TRIGGER: Primary | ICD-10-CM

## 2019-07-25 DIAGNOSIS — E11.43 TYPE 2 DIABETES MELLITUS WITH DIABETIC AUTONOMIC NEUROPATHY, WITHOUT LONG-TERM CURRENT USE OF INSULIN (HCC): ICD-10-CM

## 2019-07-25 DIAGNOSIS — J44.1 CHRONIC OBSTRUCTIVE PULMONARY DISEASE WITH ACUTE EXACERBATION (HCC): ICD-10-CM

## 2019-07-25 DIAGNOSIS — J45.20 MILD INTERMITTENT ASTHMA, UNSPECIFIED WHETHER COMPLICATED: ICD-10-CM

## 2019-07-25 PROCEDURE — 99214 OFFICE O/P EST MOD 30 MIN: CPT | Performed by: INTERNAL MEDICINE

## 2019-07-25 RX ORDER — FAMOTIDINE 20 MG/1
20 TABLET, FILM COATED ORAL 2 TIMES DAILY
Qty: 60 TABLET | Refills: 3 | Status: SHIPPED | OUTPATIENT
Start: 2019-07-25

## 2019-07-25 ASSESSMENT — ENCOUNTER SYMPTOMS
CHEST TIGHTNESS: 0
VOMITING: 0
ABDOMINAL PAIN: 0
SORE THROAT: 1
APNEA: 0
COUGH: 0
EYE DISCHARGE: 0
PHOTOPHOBIA: 0
TROUBLE SWALLOWING: 0
EYE REDNESS: 0
EYE ITCHING: 0
NAUSEA: 0
RHINORRHEA: 0
BLOOD IN STOOL: 0
SINUS PRESSURE: 0
SHORTNESS OF BREATH: 1
CONSTIPATION: 0
VOICE CHANGE: 0
RECTAL PAIN: 0
STRIDOR: 0
ABDOMINAL DISTENTION: 0
WHEEZING: 0
COLOR CHANGE: 0
EYE PAIN: 0
BACK PAIN: 0
ANAL BLEEDING: 0
FACIAL SWELLING: 0
CHOKING: 0
DIARRHEA: 0

## 2019-07-25 NOTE — PROGRESS NOTES
Subjective:      Rocio Rosado is a 75 y.o. female who presents today for follow up and management of her chronic medical conditions as noted below. HPI:    Rocio Rosado is c/o of   Chief Complaint   Patient presents with    Pharyngitis     f/u for sore throat     Results     Review labs     Rash     Pt states she still has rash, hse feels medication did not help    . ?  From plavix   dm2 controlled   sore throat better    Past Medical History:   Diagnosis Date    Arthritis     Asthma     Cerebral aneurysm, nonruptured     COPD [facility]ch[facility] obstructive [facility] disease) [facility]     DDD (degenerative disc disease), lumbar 4/18/2017    Diabetes ([facility])     GERD (gastroesophageal reflux disease)     Herpes zoster with other nervous system complications(053.19)     Hypertension     Lumbago     Migraine     Myoclonus     Neck pain     Osteoporosis     Other and unspecified hyperlipidemia     Palpitations     COMPA (renal artery stenosis) ([facility]) 7/9/2015    Seasonal allergic rhinitis 6/6/2019    Type 2 diabetes mellitus with autonomic neuropathy ([facility] [address] 9/9/2015    Vision abnormalities     glasses    Vitamin D deficiency        Past Surgical History:   Procedure Laterality Date    COLONOSCOPY  2015    CYST REMOVAL  11/2016    vaginal     DILATION AND CURETTAGE      DILATION AND CURETTAGE  10/5/15    w/ hysteroscopy    DILATION AND CURETTAGE OF UTERUS      FRACTURE SURGERY Left     HYSTEROSCOPY  10/5/15    w/ d&c    LAPAROSCOPY      UPPER GASTROINTESTINAL ENDOSCOPY         Family History   Problem Relation Age of Onset    Cancer Mother         BRAIN    Cancer Father         PANCREATIC    Cancer Sister         SKIN    Heart Disease Maternal Aunt        Social History     Socioeconomic History    Marital status:      Spouse name: Not on file    Number of children: Not on file    Years of education: Not on file    Highest education level: Not on file   Occupational History    Not on file Social Needs    Financial resource strain: Not on file    Food insecurity:     Worry: Not on file     Inability: Not on file    Transportation needs:     Medical: Not on file     Non-medical: Not on file   Tobacco Use    Smoking status: Former Smoker     Packs/day: 0.25     Years: 30.00     Pack years: 7.50     Types: Cigarettes     Start date: 1955     Last attempt to quit: 1990     Years since quittin.9    Smokeless tobacco: Never Used   Substance and Sexual Activity    Alcohol use: No     Alcohol/week: 0.0 standard drinks    Drug use: No    Sexual activity: Not Currently     Partners: Male     Birth control/protection: Post-menopausal   Lifestyle    Physical activity:     Days per week: Not on file     Minutes per session: Not on file    Stress: Not on file   Relationships    Social connections:     Talks on phone: Not on file     Gets together: Not on file     Attends Latter day service: Not on file     Active member of club or organization: Not on file     Attends meetings of clubs or organizations: Not on file     Relationship status: Not on file    Intimate partner violence:     Fear of current or ex partner: Not on file     Emotionally abused: Not on file     Physically abused: Not on file     Forced sexual activity: Not on file   Other Topics Concern    Not on file   Social History Narrative    Not on file       Current Outpatient Medications   Medication Sig Dispense Refill    glimepiride (AMARYL) 2 MG tablet TAKE ONE TABLET BY MOUTH BEFORE BREAKFAST  90 tablet 2    fluticasone (FLONASE) 50 MCG/ACT nasal spray 2 sprays by Nasal route daily 1 Bottle 11    losartan (COZAAR) 100 MG tablet TAKE 1 TABLET EVERY DAY 90 tablet 3    budesonide-formoterol (SYMBICORT) 160-4.5 MCG/ACT AERO Inhale 2 puffs into the lungs 2 times daily 3 Inhaler 3    amLODIPine (NORVASC) 10 MG tablet Take 1 tablet by mouth daily 90 tablet 3    metoprolol succinate (TOPROL XL) 50 MG extended release urgency, vaginal bleeding and vaginal discharge. Musculoskeletal: Negative for arthralgias, back pain, gait problem, joint swelling, myalgias, neck pain and neck stiffness. Skin: Positive for rash. Negative for color change, pallor and wound. Neurological: Negative for dizziness, tremors, seizures, syncope, facial asymmetry, speech difficulty, weakness, light-headedness, numbness and headaches. Hematological: Negative for adenopathy. Does not bruise/bleed easily. Psychiatric/Behavioral: Positive for sleep disturbance. Negative for agitation, behavioral problems, confusion, decreased concentration and dysphoric mood. The patient is nervous/anxious. Objective:     PhysicalExam:      Physical Exam   Constitutional: She is oriented to person, place, and time. She appears well-developed and well-nourished. No distress. HENT:   Head: Normocephalic and atraumatic. Right Ear: External ear normal.   Left Ear: External ear normal.   Nose: Nose normal.   Mouth/Throat: Oropharynx is clear and moist. No oropharyngeal exudate. Eyes: Pupils are equal, round, and reactive to light. Conjunctivae and EOM are normal. Right eye exhibits no discharge. Left eye exhibits no discharge. No scleral icterus. Neck: Normal range of motion. Neck supple. No JVD present. No tracheal deviation present. No thyromegaly present. Cardiovascular: Normal rate, regular rhythm and intact distal pulses. Exam reveals no gallop and no friction rub. Murmur heard. Pulmonary/Chest: Effort normal and breath sounds normal. No respiratory distress. She has no wheezes. She has no rales. She exhibits no tenderness. Abdominal: Soft. Bowel sounds are normal. She exhibits no distension and no mass. There is no tenderness. There is no rebound and no guarding. Genitourinary: Rectal exam shows guaiac negative stool. No vaginal discharge found. Musculoskeletal: She exhibits tenderness. She exhibits no edema.    Lymphadenopathy:     She has no cervical adenopathy. Neurological: She is alert and oriented to person, place, and time. She has normal reflexes. She displays normal reflexes. No cranial nerve deficit. She exhibits normal muscle tone. Coordination normal.   Skin: Skin is warm and dry. Rash noted. She is not diaphoretic. No erythema. No pallor. Psychiatric: Her behavior is normal. Judgment and thought content normal. Her mood appears anxious. Results for orders placed or performed in visit on 06/07/19   Calcium   Result Value Ref Range    Calcium 9.3 mg/dL   Magnesium   Result Value Ref Range    Magnesium 2.0 mg/dL   Potassium   Result Value Ref Range    Potassium (K+) 3.7    Hemoglobin A1C   Result Value Ref Range    Hemoglobin A1C 7.0 %    AVERAGE GLUCOSE 154      No results found. Assessment:      Diagnosis Orders   1. Seasonal allergic rhinitis due to other allergic trigger    Stable has medrol   2. Mild intermittent asthma, unspecified whether complicated   stabel    3. Type 2 diabetes mellitus with diabetic autonomic neuropathy, without long-term current use of insulin ([address]  At goal cont same   4. Chronic obstructive pulmonary disease with acute exacerbation (HCC)   baseline     Rash ? From plavix medrol no help / cortisone cream no help says it swells     On cozaar and norvasc     ? Relation to meds    Episodic nature and swelling  Makes it confusing    try pepcid      Plan:          Rash     no change   No better with treatent   ? From meds   doubt     dementia   Memory loss   short term   councelled    family history    labs nl        refer derm     Try pepcid  Elsie Rei received counseling on the following healthy behaviors: nutrition, exercise and medication adherence  Reviewed prior labs and health maintenance. Continue current medications, diet and exercise. Discussed use, benefit, and side effects of prescribed medications. Barriers to medication compliance addressed.    Patient given educational materials - see patient instructions. All patient questions answered. Patient voiced understanding. 1.  Patient given educational materials - see patient instructions  2. Discussed use, benefit, and side effects of prescribed medications. Barriers to medication compliance addressed. All patient questions answered. Pt voiced understanding. 3.  Reviewed prior labs and health maintenance  4. Continue current medications, diet and exercise.   5.    3m

## 2019-07-25 NOTE — PROGRESS NOTES
Visit Information    Have you changed or started any medications since your last visit including any over-the-counter medicines, vitamins, or herbal medicines? no   Are you having any side effects from any of your medications? -  no  Have you stopped taking any of your medications? Is so, why? -  no    Have you seen any other physician or provider since your last visit? No  Have you had any other diagnostic tests since your last visit? No  Have you been seen in the emergency room and/or had an admission to a hospital since we last saw you? No  Have you had your routine dental cleaning in the past 6 months? no    Have you activated your [employer] account? If not, what are your barriers?  No:      Patient Care Team:  Nish Mane MD as PCP - General (Internal Medicine)  Nish Mane MD as PCP - [facility] Provider  Arti Harley DO as Consulting Physician (Obstetrics & Gynecology)    Medical History Review  Past Medical, Family, and Social History reviewed and does not contribute to the patient presenting condition    Health Maintenance   Topic Date Due    DTaP/Tdap/Td vaccine (1 - Tdap) 06/06/2020 (Originally 2/17/1964)    Shingles Vaccine (1 of 2) 06/06/2020 (Originally 2/17/1994)    Flu vaccine (1) 09/01/2019    Diabetic retinal exam  11/29/2019    Diabetic foot exam  12/06/2019    Lipid screen  12/10/2019    Creatinine monitoring  12/10/2019    Annual Wellness Visit (AWV)  04/15/2020    A1C test (Diabetic or Prediabetic)  06/06/2020    Potassium monitoring  06/06/2020    Colon cancer screen colonoscopy  04/27/2025    DEXA (modify frequency per FRAX score)  Completed    Pneumococcal 65+ years Vaccine  Completed

## 2019-07-29 ENCOUNTER — TELEPHONE (OUTPATIENT)
Dept: INTERNAL MEDICINE CLINIC | Age: 74
End: 2019-07-29

## 2019-09-09 RX ORDER — CLOPIDOGREL BISULFATE 75 MG/1
TABLET ORAL
Qty: 90 TABLET | Refills: 2 | Status: SHIPPED | OUTPATIENT
Start: 2019-09-09 | End: 2020-06-01 | Stop reason: SDUPTHER

## 2019-09-09 RX ORDER — ROSUVASTATIN CALCIUM 10 MG/1
TABLET, COATED ORAL
Qty: 90 TABLET | Refills: 2 | Status: SHIPPED | OUTPATIENT
Start: 2019-09-09 | End: 2020-03-11

## 2019-09-09 RX ORDER — AMLODIPINE BESYLATE 10 MG/1
TABLET ORAL
Qty: 90 TABLET | Refills: 2 | Status: SHIPPED | OUTPATIENT
Start: 2019-09-09 | End: 2020-06-01 | Stop reason: SDUPTHER

## 2019-09-09 RX ORDER — LOSARTAN POTASSIUM 100 MG/1
TABLET ORAL
Qty: 90 TABLET | Refills: 2 | Status: SHIPPED | OUTPATIENT
Start: 2019-09-09 | End: 2020-06-01 | Stop reason: SDUPTHER

## 2019-09-09 RX ORDER — BUDESONIDE AND FORMOTEROL FUMARATE DIHYDRATE 160; 4.5 UG/1; UG/1
AEROSOL RESPIRATORY (INHALATION)
Qty: 10.2 G | Refills: 2 | Status: SHIPPED | OUTPATIENT
Start: 2019-09-09 | End: 2020-02-20 | Stop reason: SDUPTHER

## 2019-09-09 RX ORDER — METOPROLOL SUCCINATE 50 MG/1
TABLET, EXTENDED RELEASE ORAL
Qty: 90 TABLET | Refills: 2 | Status: SHIPPED | OUTPATIENT
Start: 2019-09-09 | End: 2020-06-01 | Stop reason: SDUPTHER

## 2019-09-17 RX ORDER — OMEPRAZOLE 20 MG/1
CAPSULE, DELAYED RELEASE ORAL
Qty: 90 CAPSULE | Refills: 2 | Status: SHIPPED | OUTPATIENT
Start: 2019-09-17 | End: 2020-06-01 | Stop reason: SDUPTHER

## 2019-09-30 ENCOUNTER — OFFICE VISIT (OUTPATIENT)
Dept: INTERNAL MEDICINE CLINIC | Age: 74
End: 2019-09-30
Payer: COMMERCIAL

## 2019-09-30 VITALS
HEART RATE: 68 BPM | OXYGEN SATURATION: 97 % | DIASTOLIC BLOOD PRESSURE: 78 MMHG | SYSTOLIC BLOOD PRESSURE: 138 MMHG | HEIGHT: 64 IN | WEIGHT: 155 LBS | BODY MASS INDEX: 26.46 KG/M2

## 2019-09-30 DIAGNOSIS — I67.1 CEREBRAL ANEURYSM, NONRUPTURED: ICD-10-CM

## 2019-09-30 DIAGNOSIS — E11.43 TYPE 2 DIABETES MELLITUS WITH DIABETIC AUTONOMIC NEUROPATHY, WITHOUT LONG-TERM CURRENT USE OF INSULIN (HCC): ICD-10-CM

## 2019-09-30 DIAGNOSIS — R41.3 MEMORY LOSS: ICD-10-CM

## 2019-09-30 DIAGNOSIS — I10 ESSENTIAL HYPERTENSION: Primary | ICD-10-CM

## 2019-09-30 DIAGNOSIS — G43.009 MIGRAINE WITHOUT AURA AND WITHOUT STATUS MIGRAINOSUS, NOT INTRACTABLE: ICD-10-CM

## 2019-09-30 PROCEDURE — 99214 OFFICE O/P EST MOD 30 MIN: CPT | Performed by: INTERNAL MEDICINE

## 2019-09-30 RX ORDER — TRIAMCINOLONE ACETONIDE 1 MG/G
CREAM TOPICAL
COMMUNITY
Start: 2019-09-17 | End: 2020-03-11

## 2019-09-30 RX ORDER — GLIMEPIRIDE 2 MG/1
TABLET ORAL
Qty: 90 TABLET | Refills: 1 | Status: SHIPPED | OUTPATIENT
Start: 2019-09-30 | End: 2019-12-30 | Stop reason: SDUPTHER

## 2019-09-30 ASSESSMENT — ENCOUNTER SYMPTOMS
COLOR CHANGE: 0
ABDOMINAL DISTENTION: 0
EYE DISCHARGE: 0
CHOKING: 0
CHEST TIGHTNESS: 0
DIARRHEA: 0
SORE THROAT: 0
EYE REDNESS: 0
EYE PAIN: 0
VOMITING: 0
VOICE CHANGE: 0
APNEA: 0
NAUSEA: 0
BLOOD IN STOOL: 0
FACIAL SWELLING: 0
TROUBLE SWALLOWING: 0
BACK PAIN: 0
RECTAL PAIN: 0
PHOTOPHOBIA: 0
EYE ITCHING: 0
SINUS PRESSURE: 0
SHORTNESS OF BREATH: 0
CONSTIPATION: 0
RHINORRHEA: 0
COUGH: 0
WHEEZING: 0
ABDOMINAL PAIN: 0
STRIDOR: 0
ANAL BLEEDING: 0

## 2019-09-30 NOTE — PROGRESS NOTES
of children: None    Years of education: None    Highest education level: None   Occupational History    None   Social Needs    Financial resource strain: None    Food insecurity:     Worry: None     Inability: None    Transportation needs:     Medical: None     Non-medical: None   Tobacco Use    Smoking status: Former Smoker     Packs/day: 0.25     Years: 30.00     Pack years: 7.50     Types: Cigarettes     Start date: 1955     Last attempt to quit: 1990     Years since quittin.1    Smokeless tobacco: Never Used   Substance and Sexual Activity    Alcohol use: No     Alcohol/week: 0.0 standard drinks    Drug use: No    Sexual activity: Not Currently     Partners: Male     Birth control/protection: Post-menopausal   Lifestyle    Physical activity:     Days per week: None     Minutes per session: None    Stress: None   Relationships    Social connections:     Talks on phone: None     Gets together: None     Attends Restorationism service: None     Active member of club or organization: None     Attends meetings of clubs or organizations: None     Relationship status: None    Intimate partner violence:     Fear of current or ex partner: None     Emotionally abused: None     Physically abused: None     Forced sexual activity: None   Other Topics Concern    None   Social History Narrative    None       Current Outpatient Medications   Medication Sig Dispense Refill    triamcinolone (KENALOG) 0.1 % cream       omeprazole (PRILOSEC) 20 MG delayed release capsule TAKE ONE CAPSULE BY MOUTH ONE TIME DAILY  90 capsule 2    SYMBICORT 160-4.5 MCG/ACT AERO INHALE 2 PUFFS INTO THE LUNGS 2 TIMES DAILY.   10.2 g 2    amLODIPine (NORVASC) 10 MG tablet TAKE ONE TABLET BY MOUTH ONE TIME DAILY  90 tablet 2    rosuvastatin (CRESTOR) 10 MG tablet TAKE ONE TABLET BY MOUTH ONE TIME DAILY  90 tablet 2    clopidogrel (PLAVIX) 75 MG tablet TAKE ONE TABLET BY MOUTH ONE TIME DAILY  90 tablet 2    losartan

## 2019-10-01 ENCOUNTER — TELEPHONE (OUTPATIENT)
Dept: INTERNAL MEDICINE CLINIC | Age: 74
End: 2019-10-01

## 2019-10-01 DIAGNOSIS — E11.43 TYPE 2 DIABETES MELLITUS WITH DIABETIC AUTONOMIC NEUROPATHY, WITHOUT LONG-TERM CURRENT USE OF INSULIN (HCC): Primary | ICD-10-CM

## 2019-10-01 LAB
AVERAGE GLUCOSE: 140
HBA1C MFR BLD: 6.5 %

## 2019-10-03 DIAGNOSIS — E11.43 TYPE 2 DIABETES MELLITUS WITH DIABETIC AUTONOMIC NEUROPATHY, WITHOUT LONG-TERM CURRENT USE OF INSULIN (HCC): ICD-10-CM

## 2019-12-31 RX ORDER — GLIMEPIRIDE 2 MG/1
2 TABLET ORAL
Qty: 30 TABLET | Refills: 0 | Status: SHIPPED | OUTPATIENT
Start: 2019-12-31 | End: 2020-01-23 | Stop reason: SDUPTHER

## 2020-01-23 ENCOUNTER — OFFICE VISIT (OUTPATIENT)
Dept: INTERNAL MEDICINE CLINIC | Age: 75
End: 2020-01-23
Payer: COMMERCIAL

## 2020-01-23 VITALS
HEART RATE: 66 BPM | WEIGHT: 156 LBS | OXYGEN SATURATION: 96 % | SYSTOLIC BLOOD PRESSURE: 118 MMHG | DIASTOLIC BLOOD PRESSURE: 72 MMHG | HEIGHT: 64 IN | BODY MASS INDEX: 26.63 KG/M2

## 2020-01-23 PROCEDURE — 99214 OFFICE O/P EST MOD 30 MIN: CPT | Performed by: INTERNAL MEDICINE

## 2020-01-23 RX ORDER — GLIMEPIRIDE 1 MG/1
1 TABLET ORAL
Qty: 90 TABLET | Refills: 2 | Status: SHIPPED | OUTPATIENT
Start: 2020-01-23 | End: 2020-03-11

## 2020-01-23 ASSESSMENT — ENCOUNTER SYMPTOMS
SHORTNESS OF BREATH: 0
COLOR CHANGE: 0
BLOOD IN STOOL: 0
EYE DISCHARGE: 0
DIARRHEA: 0
WHEEZING: 0
EYE PAIN: 0
ABDOMINAL DISTENTION: 0
COUGH: 1
TROUBLE SWALLOWING: 0

## 2020-01-23 ASSESSMENT — PATIENT HEALTH QUESTIONNAIRE - PHQ9
SUM OF ALL RESPONSES TO PHQ QUESTIONS 1-9: 2
2. FEELING DOWN, DEPRESSED OR HOPELESS: 1
SUM OF ALL RESPONSES TO PHQ QUESTIONS 1-9: 2
SUM OF ALL RESPONSES TO PHQ9 QUESTIONS 1 & 2: 2
1. LITTLE INTEREST OR PLEASURE IN DOING THINGS: 1

## 2020-01-23 NOTE — PROGRESS NOTES
[address] [address]  Dept: [phone]  Dept Fax: [phone]     Estefani Feliciano is a 75 y.o. female who presents today for her medical conditions/complaintsas noted below. Estefani Feliciano is c/o of   Chief Complaint   Patient presents with    New Patient    Cough         HPI:      Diabetes   Duration more than 7 years  Modifying factors on Glucophage and other med  Severity uncontrolled sever  Associated signs and symtoms neuropathy/ckd/ CAD. aggravated with sugar diet and better with low sugar diet     HTN  Onset more than 2 years ago  lexa mild to mod  Controlled with current po meds  Not associated with headaches or blurry vision    Diabetes   Duration more than 7 years  Modifying factors on Glucophage and other med  Severity uncontrolled sever  Associated signs and symtoms neuropathy/ckd/ CAD. aggravated with sugar diet and better with low sugar diet           Hemoglobin A1C (%)   Date Value   10/01/2019 6.5   06/06/2019 7.0   12/06/2018 6.3             ( goal A1Cis < 7)   Microalb/Crt.  Ratio (mcg/mg creat)   Date Value   06/24/2014 7     LDL Cholesterol (mg/dL)   Date Value   06/24/2014 80     LDL Calculated (mg/dL)   Date Value   12/10/2018 76   09/26/2017 158   02/08/2017 135       (goal LDL is <100)   AST (U/L)   Date Value   12/10/2018 17     ALT (U/L)   Date Value   12/10/2018 20     BUN (mg/dL)   Date Value   12/10/2018 15     BP Readings from Last 3 Encounters:   01/23/20 118/72   09/30/19 138/78   07/25/19 132/82          (goal 120/80)    Past Medical History:   Diagnosis Date    Arthritis     Asthma     Cerebral aneurysm, nonruptured     COPD (chronic obstructive pulmonary disease) ([facility] [address]     DDD (degenerative disc disease), lumbar 4/18/2017    Diabetes (HCC)     GERD (gastroesophageal reflux disease)     Herpes zoster with other nervous system complications(053.19)     Hypertension     Lumbago     Migraine     Myoclonus     losartan (COZAAR) 100 MG tablet TAKE ONE TABLET BY MOUTH ONE TIME DAILY  90 tablet 2    metoprolol succinate (TOPROL XL) 50 MG extended release tablet TAKE ONE TABLET BY MOUTH ONE TIME DAILY  90 tablet 2    fluticasone (FLONASE) 50 MCG/ACT nasal spray 2 sprays by Nasal route daily 1 Bottle 11    butalbital-acetaminophen-caffeine (FIORICET, ESGIC) -40 MG per tablet Take 1 tablet by mouth every 6 hours as needed for Headaches 30 tablet 1    Cholecalciferol (VITAMIN D) 2000 UNITS CAPS capsule Take 2,000 Units by mouth daily      vitamin E 400 UNIT capsule Take 400 Units by mouth daily      methylcellulose (CITRUCEL) 500 MG TABS Take by mouth      CALCIUM CITRATE PO Take  by mouth daily.  triamcinolone (KENALOG) 0.1 % cream       famotidine (PEPCID) 20 MG tablet Take 1 tablet by mouth 2 times daily (Patient not taking: Reported on 1/23/2020) 60 tablet 3     No current facility-administered medications for this visit.       Allergies   Allergen Reactions    Lipitor [Atorvastatin]     Tetracaine Hcl     Zanaflex [Tizanidine Hcl] Other (See Comments)     Near syncope     Celebrex [Celecoxib] Rash    Flonase [Fluticasone Propionate] Rash and Other (See Comments)     Blood pressure goes up    Pontocaine [Tetracaine] Rash and Other (See Comments)     Blood pressure goes up       Health Maintenance   Topic Date Due    Diabetic retinal exam  11/29/2019    Diabetic foot exam  12/06/2019    Lipid screen  12/10/2019    Creatinine monitoring  12/10/2019    DTaP/Tdap/Td vaccine (1 - Tdap) 06/06/2020 (Originally 2/17/1956)    Shingles Vaccine (1 of 2) 06/06/2020 (Originally 2/17/1994)    Annual Wellness Visit (AWV)  04/15/2020    Potassium monitoring  06/06/2020    A1C test (Diabetic or Prediabetic)  10/01/2020    Colon cancer screen colonoscopy  04/27/2025    DEXA (modify frequency per FRAX score)  Completed    Flu vaccine  Completed    Pneumococcal 65+ years Vaccine  Completed       Subjective:

## 2020-01-24 LAB
ALBUMIN SERPL-MCNC: 4.4 G/DL
ALP BLD-CCNC: 84 U/L
ALT SERPL-CCNC: 15 U/L
ANION GAP SERPL CALCULATED.3IONS-SCNC: 9 MMOL/L
AST SERPL-CCNC: 16 U/L
AVERAGE GLUCOSE: 137
BILIRUB SERPL-MCNC: 0.5 MG/DL (ref 0.1–1.4)
BUN BLDV-MCNC: 17 MG/DL
CALCIUM SERPL-MCNC: 9.4 MG/DL
CHLORIDE BLD-SCNC: 103 MMOL/L
CHOLESTEROL, FASTING: 174
CO2: 29 MMOL/L
CREAT SERPL-MCNC: 0.81 MG/DL
GFR CALCULATED: >60
GLUCOSE BLD-MCNC: 137 MG/DL
HBA1C MFR BLD: 6.4 %
HDLC SERPL-MCNC: 66 MG/DL (ref 35–70)
LDL CHOLESTEROL CALCULATED: 86 MG/DL (ref 0–160)
POTASSIUM SERPL-SCNC: 4.1 MMOL/L
SODIUM BLD-SCNC: 141 MMOL/L
TOTAL PROTEIN: 7
TRIGLYCERIDE, FASTING: 111

## 2020-02-06 ENCOUNTER — OFFICE VISIT (OUTPATIENT)
Dept: INTERNAL MEDICINE CLINIC | Age: 75
End: 2020-02-06
Payer: COMMERCIAL

## 2020-02-06 VITALS
HEART RATE: 60 BPM | HEIGHT: 64 IN | WEIGHT: 155 LBS | DIASTOLIC BLOOD PRESSURE: 72 MMHG | OXYGEN SATURATION: 95 % | SYSTOLIC BLOOD PRESSURE: 124 MMHG | BODY MASS INDEX: 26.46 KG/M2

## 2020-02-06 PROBLEM — R05.9 COUGH: Status: ACTIVE | Noted: 2020-02-06

## 2020-02-06 PROBLEM — R10.33 PERIUMBILICAL ABDOMINAL PAIN: Status: ACTIVE | Noted: 2020-02-06

## 2020-02-06 PROCEDURE — 99214 OFFICE O/P EST MOD 30 MIN: CPT | Performed by: INTERNAL MEDICINE

## 2020-02-06 ASSESSMENT — ENCOUNTER SYMPTOMS
EYE PAIN: 0
COUGH: 0
ABDOMINAL DISTENTION: 0
TROUBLE SWALLOWING: 0
EYE DISCHARGE: 0
ABDOMINAL PAIN: 1
DIARRHEA: 0
COLOR CHANGE: 0
BLOOD IN STOOL: 0
WHEEZING: 0
SHORTNESS OF BREATH: 0

## 2020-02-06 NOTE — PROGRESS NOTES
Visit Information    Have you changed or started any medications since your last visit including any over-the-counter medicines, vitamins, or herbal medicines? no   Are you having any side effects from any of your medications? -  no  Have you stopped taking any of your medications? Is so, why? -  no    Have you seen any other physician or provider since your last visit? No  Have you had any other diagnostic tests since your last visit? Yes - Records Obtained  Have you been seen in the emergency room and/or had an admission to a hospital since we last saw you? No  Have you had your routine dental cleaning in the past 6 months? yes -     Have you activated your [employer] account? If not, what are your barriers?  No:      Patient Care Team:  Cheryl Pulido MD as PCP - General (Internal Medicine)  Cheryl Pulido MD as PCP - [facility] Provider  Kevyn Hunter DO as Consulting Physician (Obstetrics & Gynecology)    Medical History Review  Past Medical, Family, and Social History reviewed and does not contribute to the patient presenting condition    Health Maintenance   Topic Date Due    Hepatitis B vaccine (1 of 3 - Risk 3-dose series) [birth year]17/1963    Diabetic retinal exam  11/29/2019    DTaP/Tdap/Td vaccine (1 - Tdap) 06/06/2020 (Originally 2/17/1956)    Shingles Vaccine (1 of 2) 06/06/2020 (Originally 2/17/1994)    Annual Wellness Visit (AWV)  04/16/2020    Diabetic foot exam  01/23/2021    A1C test (Diabetic or Prediabetic)  01/24/2021    Lipid screen  01/24/2021    Potassium monitoring  01/24/2021    Creatinine monitoring  01/24/2021    Colon cancer screen colonoscopy  04/27/2025    DEXA (modify frequency per FRAX score)  Completed    Flu vaccine  Completed    Pneumococcal 65+ years Vaccine  Completed    Hepatitis A vaccine  Aged Out    Hib vaccine  Aged Out    Meningococcal (ACWY) vaccine  Aged Out
1 Bottle 11    butalbital-acetaminophen-caffeine (FIORICET, ESGIC) -40 MG per tablet Take 1 tablet by mouth every 6 hours as needed for Headaches 30 tablet 1    Cholecalciferol (VITAMIN D) 2000 UNITS CAPS capsule Take 2,000 Units by mouth daily      vitamin E 400 UNIT capsule Take 400 Units by mouth daily      methylcellulose (CITRUCEL) 500 MG TABS Take by mouth      CALCIUM CITRATE PO Take  by mouth daily. No current facility-administered medications for this visit. Allergies   Allergen Reactions    Lipitor [Atorvastatin]     Tetracaine Hcl     Zanaflex [Tizanidine Hcl] Other (See Comments)     Near syncope     Celebrex [Celecoxib] Rash    Flonase [Fluticasone Propionate] Rash and Other (See Comments)     Blood pressure goes up    Pontocaine [Tetracaine] Rash and Other (See Comments)     Blood pressure goes up       Health Maintenance   Topic Date Due    Hepatitis B vaccine (1 of 3 - Risk 3-dose series) 02/17/1963    Diabetic retinal exam  11/29/2019    DTaP/Tdap/Td vaccine (1 - Tdap) 06/06/2020 (Originally 2/17/1956)    Shingles Vaccine (1 of 2) 06/06/2020 (Originally 2/17/1994)    Annual Wellness Visit (AWV)  04/16/2020    Diabetic foot exam  01/23/2021    A1C test (Diabetic or Prediabetic)  01/24/2021    Lipid screen  01/24/2021    Potassium monitoring  01/24/2021    Creatinine monitoring  01/24/2021    Colon cancer screen colonoscopy  04/27/2025    DEXA (modify frequency per FRAX score)  Completed    Flu vaccine  Completed    Pneumococcal 65+ years Vaccine  Completed    Hepatitis A vaccine  Aged Out    Hib vaccine  Aged Out    Meningococcal (ACWY) vaccine  Aged Out       Subjective:     Review of Systems   Constitutional: Negative for appetite change, diaphoresis and fatigue. HENT: Negative for ear discharge and trouble swallowing. Eyes: Negative for pain and discharge. Respiratory: Negative for cough, shortness of breath and wheezing.     Cardiovascular:

## 2020-02-20 NOTE — TELEPHONE ENCOUNTER
Medication: Symbicort  Last visit: 2/6/20  Next visit: 4/15/2020  Last refill: 9/9/19  Pharmacy: [facility]

## 2020-02-21 RX ORDER — BUDESONIDE AND FORMOTEROL FUMARATE DIHYDRATE 160; 4.5 UG/1; UG/1
AEROSOL RESPIRATORY (INHALATION)
Qty: 10.2 G | Refills: 2 | Status: SHIPPED | OUTPATIENT
Start: 2020-02-21 | End: 2020-03-11

## 2020-03-07 PROBLEM — R05.9 COUGH: Status: RESOLVED | Noted: 2020-02-06 | Resolved: 2020-03-07

## 2020-03-11 ENCOUNTER — OFFICE VISIT (OUTPATIENT)
Dept: OBGYN CLINIC | Age: 75
End: 2020-03-11
Payer: COMMERCIAL

## 2020-03-11 VITALS
HEART RATE: 73 BPM | WEIGHT: 159 LBS | BODY MASS INDEX: 27.14 KG/M2 | SYSTOLIC BLOOD PRESSURE: 116 MMHG | DIASTOLIC BLOOD PRESSURE: 64 MMHG | HEIGHT: 64 IN

## 2020-03-11 PROCEDURE — 99203 OFFICE O/P NEW LOW 30 MIN: CPT | Performed by: OBSTETRICS & GYNECOLOGY

## 2020-03-11 NOTE — PROGRESS NOTES
Arlen Borrero  3/11/2020      Arlen Borrero is a 67 y.o. female       The patient was seen today. She was here to follow-up regarding her labs and diagnostics ordered at her last visit for the diagnosis of:    ICD-10-CM    1. Postmenopausal status (age-related) (natural) Z78.0 DEXA Bone Density 2 Sites   2. Normal pelvic exam Z01.419    3. Endometrial thickening on ultrasound R93.89 US Pelvis Complete     US Non OB Transvaginal       She does not have any specific chief complaint today. Her bowels are regular and she is voiding without difficulty. Pt had D&C Hscope  with Noted floor calcified fibroid. NOP PMB. Ultrasound ordered by PCP for pain around [place]. Op report reviewed. I discussed sono findings of thickened endometrium. Per radiology not much change from the 2015 imaging. I reviewed the options of sampling and the risk of uterine cancer. Morbidity and or mortality reviewed. No cc currently. Symptoms that initiated work up has resolved. All other prevent health recs through PCP for pt. Dexa ordered.     Past Medical History:   Diagnosis Date    Arthritis     Asthma     AVM (arteriovenous malformation)     Cerebral aneurysm, nonruptured     COPD (chronic obstructive pulmonary disease) (HCC)     DDD (degenerative disc disease), lumbar 2017    Diabetes ([address]     GERD (gastroesophageal reflux disease)     Herpes zoster with other nervous system complications(053.19)     Hypertension     Lumbago     Migraine     Myoclonus     Neck pain     Osteoporosis     Other and unspecified hyperlipidemia     Palpitations     COMPA (renal artery stenosis) ([address] 2015    Seasonal allergic rhinitis 2019    Seasonal allergic rhinitis     Stroke [facility]) 2018    Type 2 diabetes mellitus with autonomic neuropathy ([address] 2015    Vision abnormalities     glasses    Vitamin D deficiency          Past Surgical History:   Procedure Laterality Date    COLONOSCOPY      CYST REMOVAL  2016    vaginal     DILATION AND CURETTAGE      DILATION AND CURETTAGE  10/5/15    w/ hysteroscopy    DILATION AND CURETTAGE OF UTERUS      FRACTURE SURGERY Left     HYSTEROSCOPY  10/5/15    w/ d&c    UPPER GASTROINTESTINAL ENDOSCOPY           Family History   Problem Relation Age of Onset    Cancer Mother         BRAIN    Cancer Father         PANCREATIC    Cancer Sister         SKIN    Heart Disease Maternal Aunt          Social History     Tobacco Use    Smoking status: Former Smoker     Packs/day: 0.25     Years: 30.00     Pack years: 7.50     Types: Cigarettes     Start date: 1955     Last attempt to quit: 1990     Years since quittin.6    Smokeless tobacco: Never Used   Substance Use Topics    Alcohol use: No     Alcohol/week: 0.0 standard drinks    Drug use: No         MEDICATIONS:  Current Outpatient Medications   Medication Sig Dispense Refill    omeprazole (PRILOSEC) 20 MG delayed release capsule TAKE ONE CAPSULE BY MOUTH ONE TIME DAILY  90 capsule 2    amLODIPine (NORVASC) 10 MG tablet TAKE ONE TABLET BY MOUTH ONE TIME DAILY  90 tablet 2    clopidogrel (PLAVIX) 75 MG tablet TAKE ONE TABLET BY MOUTH ONE TIME DAILY  90 tablet 2    losartan (COZAAR) 100 MG tablet TAKE ONE TABLET BY MOUTH ONE TIME DAILY  90 tablet 2    metoprolol succinate (TOPROL XL) 50 MG extended release tablet TAKE ONE TABLET BY MOUTH ONE TIME DAILY  90 tablet 2    famotidine (PEPCID) 20 MG tablet Take 1 tablet by mouth 2 times daily 60 tablet 3    butalbital-acetaminophen-caffeine (FIORICET, ESGIC) -40 MG per tablet Take 1 tablet by mouth every 6 hours as needed for Headaches 30 tablet 1    vitamin E 400 UNIT capsule Take 400 Units by mouth daily      CALCIUM CITRATE PO Take  by mouth daily. No current facility-administered medications for this visit.           ALLERGIES:  Allergies as of 2020 - Review Complete 2020   Allergen Reaction Noted    Lipitor [atorvastatin]  11/11/2015    Tetracaine hcl  04/13/2017    Zanaflex [tizanidine hcl] Other (See Comments) 09/15/2017    Celebrex [celecoxib] Rash 06/20/2014    Flonase [fluticasone propionate] Rash and Other (See Comments) 06/20/2014    Pontocaine [tetracaine] Rash and Other (See Comments) 06/20/2014         Blood pressure 116/64, pulse 73, height 5' 4\" (1.626 m), weight 159 lb (72.1 kg), last menstrual period 02/01/1996, not currently breastfeeding. Abdomen: Soft non-tender; good bowel sounds. No guarding, rebound or rigidity. No CVA tenderness bilaterally. Extremities: No calf tenderness, DTR 2/4, and No edema bilaterally    Pelvic: Declined         Diagnostics:  Other Result Information   This result has an attachment that is not available.    Result Narrative   HISTORY AND/OR TECH NOTES  PREVIOUS PELVIC US ABNORMAL    Follow-up abnormal pelvic ultrasound      PROCEDURE  Transabdominal and transvaginal images submitted    COMPARISON  2015 July      FINDINGS    Uterus was measured at 58 x 26 x 46 mm  Right ovary 14 x 11 x 9 mm  Left ovary is not seen, presumably related to the small size and acoustic window available.  It was not seen previously    Transabdominal images  Bladder and uterus seen  Hyperechogenic area along the anterior uterus similar to before  Heterogeneous appearance of both the uterus is similar to before presumably relating to a bowel  Transvaginal images  Right ovary shown with no dominant cyst  Small heterogeneous uterus  Hyperechogenic area along the anterior aspect measures 9 x 18 x 9 mm.  There is some echogenic shadowing calcification noted.  Appearance is similar to before  Endometrium now measures around 5 mm.  There is only trace fluid along the endometrial canal, less impressive than on the previous exam  There is not significant free fluid seen  There is no cystic adnexal mass seen        IMPRESSION:    The endometrium is somewhat thickened for a postmenopausal woman measuring around 5 mm with a small amount of fluid again seen in the endometrial canal, less impressive than on the previous exam    Correlate for dysfunctional bleeding that might prompt additional histologic workup    There is a slight anteflexed uterine configuration and heterogeneity of the small uterus with a hyperechogenic partially calcified masslike area along the anterior uterine body which looks similar to before        -----------------------------------------------------------------------      Workstation:[id number]    Finalized by Zoltan Hansen MD on 2/11/2020 2:45 PM   Other Result Information   Interface - Rad Results/Orders In 1 - 02/11/2020  2:46 PM EST  HISTORY AND/OR TECH NOTES  PREVIOUS PELVIC US ABNORMAL    Follow-up abnormal pelvic ultrasound      PROCEDURE  Transabdominal and transvaginal images submitted    COMPARISON  2015 July      FINDINGS    Uterus was measured at 62 x 26 x 46 mm  Right ovary 14 x 11 x 9 mm  Left ovary is not seen, presumably related to the small size and acoustic window available. It was not seen previously    Transabdominal images  Bladder and uterus seen  Hyperechogenic area along the anterior uterus similar to before  Heterogeneous appearance of both the uterus is similar to before presumably relating to a bowel  Transvaginal images  Right ovary shown with no dominant cyst  Small heterogeneous uterus  Hyperechogenic area along the anterior aspect measures 9 x 18 x 9 mm. There is some echogenic shadowing calcification noted. Appearance is similar to before  Endometrium now measures around 5 mm.   There is only trace fluid along the endometrial canal, less impressive than on the previous exam  There is not significant free fluid seen  There is no cystic adnexal mass seen        IMPRESSION:    The endometrium is somewhat thickened for a postmenopausal woman measuring around 5 mm with a small amount of fluid again seen in the endometrial canal, less impressive than on fibroid and thickening. Pt declined biopsy       Patient was seen with total face to face time of 30 minutes. More than 50% of this visit was counseling and education regarding The primary encounter diagnosis was Postmenopausal status (age-related) (natural). Diagnoses of Normal pelvic exam and Endometrial thickening on ultrasound were also pertinent to this visit. and New Patient   as well as  counseling on preventative health maintenance follow-up.

## 2020-05-06 LAB
AVERAGE GLUCOSE: 148
HBA1C MFR BLD: 6.8 %

## 2020-06-01 RX ORDER — METOPROLOL SUCCINATE 50 MG/1
50 TABLET, EXTENDED RELEASE ORAL DAILY
Qty: 90 TABLET | Refills: 3 | Status: SHIPPED | OUTPATIENT
Start: 2020-06-01

## 2020-06-01 RX ORDER — OMEPRAZOLE 20 MG/1
20 CAPSULE, DELAYED RELEASE ORAL DAILY
Qty: 90 CAPSULE | Refills: 3 | Status: SHIPPED | OUTPATIENT
Start: 2020-06-01

## 2020-06-01 RX ORDER — CLOPIDOGREL BISULFATE 75 MG/1
75 TABLET ORAL DAILY
Qty: 90 TABLET | Refills: 3 | Status: SHIPPED | OUTPATIENT
Start: 2020-06-01

## 2020-06-01 RX ORDER — AMLODIPINE BESYLATE 10 MG/1
10 TABLET ORAL DAILY
Qty: 90 TABLET | Refills: 3 | Status: SHIPPED | OUTPATIENT
Start: 2020-06-01

## 2020-06-01 RX ORDER — ROSUVASTATIN CALCIUM 10 MG/1
10 TABLET, COATED ORAL DAILY
Qty: 90 TABLET | Refills: 3 | Status: SHIPPED | OUTPATIENT
Start: 2020-06-01

## 2020-06-01 RX ORDER — LOSARTAN POTASSIUM 100 MG/1
100 TABLET ORAL DAILY
Qty: 90 TABLET | Refills: 3 | Status: SHIPPED | OUTPATIENT
Start: 2020-06-01

## 2020-06-01 NOTE — TELEPHONE ENCOUNTER
Medication: multiple  Last visit: 02/06/20  Next visit: Visit date not found  Last refill: 09/2019  [facility]

## 2020-07-07 RX ORDER — GLIMEPIRIDE 1 MG/1
TABLET ORAL
COMMUNITY
Start: 2020-04-13 | End: 2020-10-19

## 2020-07-07 RX ORDER — BUDESONIDE AND FORMOTEROL FUMARATE DIHYDRATE 160; 4.5 UG/1; UG/1
AEROSOL RESPIRATORY (INHALATION)
COMMUNITY
Start: 2020-05-30 | End: 2020-08-11

## 2020-07-08 ENCOUNTER — OFFICE VISIT (OUTPATIENT)
Dept: INTERNAL MEDICINE CLINIC | Age: 75
End: 2020-07-08
Payer: COMMERCIAL

## 2020-07-08 VITALS
HEART RATE: 66 BPM | WEIGHT: 158 LBS | DIASTOLIC BLOOD PRESSURE: 70 MMHG | BODY MASS INDEX: 26.98 KG/M2 | HEIGHT: 64 IN | OXYGEN SATURATION: 96 % | SYSTOLIC BLOOD PRESSURE: 124 MMHG | TEMPERATURE: 97.9 F

## 2020-07-08 PROCEDURE — 99214 OFFICE O/P EST MOD 30 MIN: CPT | Performed by: INTERNAL MEDICINE

## 2020-07-08 ASSESSMENT — ENCOUNTER SYMPTOMS
DIARRHEA: 0
COLOR CHANGE: 0
BLOOD IN STOOL: 0
SHORTNESS OF BREATH: 0
ABDOMINAL PAIN: 1
TROUBLE SWALLOWING: 0
WHEEZING: 0
COUGH: 0
EYE PAIN: 0
EYE DISCHARGE: 0
ABDOMINAL DISTENTION: 0

## 2020-07-08 NOTE — PROGRESS NOTES
Visit Information    Have you changed or started any medications since your last visit including any over-the-counter medicines, vitamins, or herbal medicines? no   Are you having any side effects from any of your medications? -  no  Have you stopped taking any of your medications? Is so, why? -  no    Have you seen any other physician or provider since your last visit? Yes - Records Obtained  Have you had any other diagnostic tests since your last visit? Yes - Records Obtained  Have you been seen in the emergency room and/or had an admission to a hospital since we last saw you? No  Have you had your routine dental cleaning in the past 6 months? yes -     Have you activated your [employer] account? If not, what are your barriers?  No:      Patient Care Team:  Jennie Simms MD as PCP - General (Internal Medicine)  Jennie Simms MD as PCP - [facility]  Toribio Sacks, DO as Consulting Physician (Obstetrics & Gynecology)    Medical History Review  Past Medical, Family, and Social History reviewed and does not contribute to the patient presenting condition    Health Maintenance   Topic Date Due    DTaP/Tdap/Td vaccine (1 - Tdap) 02/17/1963    Shingles Vaccine (1 of 2) 02/17/1994    Annual Wellness Visit (AWV)  05/29/2019    Flu vaccine (1) 09/01/2020    Lipid screen  01/24/2021    Potassium monitoring  01/24/2021    Creatinine monitoring  01/24/2021    DEXA (modify frequency per FRAX score)  Completed    Pneumococcal 65+ years Vaccine  Completed    Hepatitis A vaccine  Aged Out    Hib vaccine  Aged Out    Meningococcal (ACWY) vaccine  Aged Out

## 2020-07-08 NOTE — PROGRESS NOTES
[address] [address]  Dept: [phone]  Dept Fax: [phone]     Ramy Vela is a 67 y.o. female who presents today for her medical conditions/complaintsas noted below. Ramy Vela is c/o of   Chief Complaint   Patient presents with    Other     had appt with Dr. Julieta Guajardo has questions about testing     Medication Check     questions  about vitamins that she is taking          HPI:      abdo pain   Mild  Us abdo thick uterus  Seen obygyn  Pt seen dr Elena guerrero dc and bx  Pt delcine for surg and aneastheis risk  No blood in stool for vagina        Hemoglobin A1C (%)   Date Value   05/06/2020 6.8   01/24/2020 6.4   10/01/2019 6.5             ( goal A1Cis < 7)   Microalb/Crt.  Ratio (mcg/mg creat)   Date Value   06/24/2014 7     LDL Cholesterol (mg/dL)   Date Value   06/24/2014 80     LDL Calculated (mg/dL)   Date Value   01/24/2020 86   12/10/2018 76   09/26/2017 158       (goal LDL is <100)   AST (U/L)   Date Value   01/24/2020 16     ALT (U/L)   Date Value   01/24/2020 15     BUN (mg/dL)   Date Value   01/24/2020 17     BP Readings from Last 3 Encounters:   07/08/20 124/70   03/11/20 116/64   02/06/20 124/72          (goal 120/80)    Past Medical History:   Diagnosis Date    Arthritis     Asthma     AVM (arteriovenous malformation) 1945    Cerebral aneurysm, nonruptured     [facility]     DDD (degenerative disc disease), lumbar 4/18/2017    Diabetes ([facility])     GERD (gastroesophageal reflux disease)     Herpes zoster with other nervous system complications(053.19)     Hypertension     Lumbago     Migraine     Myoclonus     Neck pain     Osteoporosis     Other and unspecified hyperlipidemia     Palpitations     COMPA (renal artery stenosis) ([facility]) 7/9/2015    Seasonal allergic rhinitis 6/6/2019    Seasonal allergic rhinitis     Stroke ([facility] 2018    [facility] 2015    Vision abnormalities     glasses    Vitamin D deficiency       Past Surgical History:   Procedure Laterality Date    COLONOSCOPY  2015    CYST REMOVAL  2016    vaginal     DILATION AND CURETTAGE      DILATION AND CURETTAGE  10/5/15    w/ hysteroscopy    DILATION AND CURETTAGE OF UTERUS      FRACTURE SURGERY Left     HYSTEROSCOPY  10/5/15    w/ d&c    UPPER GASTROINTESTINAL ENDOSCOPY         Family History   Problem Relation Age of Onset    Cancer Mother         BRAIN    Cancer Father         PANCREATIC    Cancer Sister         SKIN    Heart Disease Maternal Aunt           Social History     Tobacco Use    Smoking status: Former Smoker     Packs/day: 0.25     Years: 30.00     Pack years: 7.50     Types: Cigarettes     Start date: 1955     Last attempt to quit: 1990     Years since quittin.9    Smokeless tobacco: Never Used   Substance Use Topics    Alcohol use: No     Alcohol/week: 0.0 standard drinks         Current Outpatient Medications   Medication Sig Dispense Refill    SYMBICORT 160-4.5 MCG/ACT AERO       glimepiride (AMARYL) 1 MG tablet       losartan (COZAAR) 100 MG tablet Take 1 tablet by mouth daily 90 tablet 3    rosuvastatin (CRESTOR) 10 MG tablet Take 1 tablet by mouth daily 90 tablet 3    amLODIPine (NORVASC) 10 MG tablet Take 1 tablet by mouth daily 90 tablet 3    clopidogrel (PLAVIX) 75 MG tablet Take 1 tablet by mouth daily 90 tablet 3    metoprolol succinate (TOPROL XL) 50 MG extended release tablet Take 1 tablet by mouth daily 90 tablet 3    omeprazole (PRILOSEC) 20 MG delayed release capsule Take 1 capsule by mouth Daily 90 capsule 3    butalbital-acetaminophen-caffeine (FIORICET, ESGIC) -40 MG per tablet Take 1 tablet by mouth every 6 hours as needed for Headaches 30 tablet 1    vitamin E 400 UNIT capsule Take 400 Units by mouth daily      CALCIUM CITRATE PO Take  by mouth daily.       famotidine (PEPCID) 20 MG tablet Take 1 tablet by mouth 2 times daily (Patient not taking: Reported on 7/8/2020) 60 tablet 3     No current facility-administered medications for this visit. Allergies   Allergen Reactions    Lipitor [Atorvastatin]     Tetracaine Hcl     Zanaflex [Tizanidine Hcl] Other (See Comments)     Near syncope     Celebrex [Celecoxib] Rash    Flonase [Fluticasone Propionate] Rash and Other (See Comments)     Blood pressure goes up    Pontocaine [Tetracaine] Rash and Other (See Comments)     Blood pressure goes up       Health Maintenance   Topic Date Due    DTaP/Tdap/Td vaccine (1 - Tdap) 02/17/1963    Shingles Vaccine (1 of 2) 02/17/1994    Annual Wellness Visit (AWV)  05/29/2019    Flu vaccine (1) 09/01/2020    Lipid screen  01/24/2021    Potassium monitoring  01/24/2021    Creatinine monitoring  01/24/2021    DEXA (modify frequency per FRAX score)  Completed    Pneumococcal 65+ years Vaccine  Completed    Hepatitis A vaccine  Aged Out    Hib vaccine  Aged Out    Meningococcal (ACWY) vaccine  Aged Out       Subjective:     Review of Systems   Constitutional: Negative for appetite change, diaphoresis and fatigue. HENT: Negative for ear discharge and trouble swallowing. Eyes: Negative for pain and discharge. Respiratory: Negative for cough, shortness of breath and wheezing. Cardiovascular: Negative for chest pain and palpitations. Gastrointestinal: Positive for abdominal pain. Negative for abdominal distention, blood in stool and diarrhea. Endocrine: Negative for polydipsia and polyphagia. Genitourinary: Negative for difficulty urinating and frequency. Musculoskeletal: Negative for gait problem, myalgias and neck pain. Skin: Negative for color change and rash. Allergic/Immunologic: Negative for environmental allergies and food allergies. Neurological: Negative for dizziness and headaches. Hematological: Negative for adenopathy. Does not bruise/bleed easily. Psychiatric/Behavioral: Negative for behavioral problems and sleep disturbance. Objective:     Physical Exam  Constitutional:       Appearance: She is well-developed. She is not diaphoretic. HENT:      Head: Normocephalic and atraumatic. Eyes:      General:         Right eye: No discharge. Left eye: No discharge. Extraocular Movements:      Right eye: Normal extraocular motion. Left eye: Normal extraocular motion. Conjunctiva/sclera: Conjunctivae normal.      Right eye: Right conjunctiva is not injected. Left eye: Left conjunctiva is not injected. Neck:      Musculoskeletal: Normal range of motion and neck supple. No edema or erythema. Thyroid: No thyroid mass or thyromegaly. Vascular: No JVD. Cardiovascular:      Rate and Rhythm: Normal rate and regular rhythm. Heart sounds: No murmur. No friction rub. Pulmonary:      Effort: Pulmonary effort is normal. No tachypnea, bradypnea, accessory muscle usage or respiratory distress. Breath sounds: Normal breath sounds. No wheezing or rales. Abdominal:      General: Bowel sounds are normal. There is no distension. Palpations: Abdomen is soft. Tenderness: There is no abdominal tenderness. There is no rebound. Musculoskeletal: Normal range of motion. General: No tenderness. Lymphadenopathy:      Head:      Right side of head: No submental or submandibular adenopathy. Left side of head: No submental or submandibular adenopathy. Cervical: No cervical adenopathy. Skin:     General: Skin is warm. Coloration: Skin is not pale. Findings: No bruising, ecchymosis or rash. Neurological:      Mental Status: She is alert and oriented to person, place, and time. Cranial Nerves: No cranial nerve deficit. Sensory: No sensory deficit. Motor: No atrophy or abnormal muscle tone.       Coordination: Coordination normal.   Psychiatric:         Mood and Affect: Mood is not anxious. Affect is not angry. Speech: Speech is not slurred. Behavior: Behavior normal. Behavior is not aggressive. Thought Content: Thought content does not include homicidal ideation. Cognition and Memory: Memory is not impaired. /70   Pulse 66   Temp 97.9 °F (36.6 °C)   Ht 5' 4.02\" (1.626 m)   Wt 158 lb (71.7 kg)   LMP 02/01/1996 (Approximate)   SpO2 96%   BMI 27.11 kg/m²     Assessment:          Diagnosis Orders   1. Lower abdominal pain  CT ABDOMEN PELVIS WO CONTRAST   2. Type 2 diabetes mellitus with diabetic autonomic neuropathy, without long-term current use of insulin (HCC)     3. Periumbilical abdominal pain     4. Thickened endometrium     5. Spinal stenosis of lumbar region without neurogenic claudication               Plan:      No follow-ups on file. Orders Placed This Encounter   Procedures    CT ABDOMEN PELVIS WO CONTRAST     Standing Status:   Future     Standing Expiration Date:   7/8/2021     Order Specific Question:   Reason for exam:     Answer:   low back pain      No orders of the defined types were placed in this encounter. abdo pain   Mild  Us abdo thick uterus  Seen obygyn  Pt seen dr Krista Hinkle  offerd dc and bx  Pt delcine for surg and aneastheis risk  No blood in stool for vagina  Will do ct scan abod  Will dicuss further  May need uterine bx if abdo ct is negative     Patient given educational materials - see patient instructions. Discussed use, benefit, and side effects of prescribed medications. All patientquestions answered. Pt voiced understanding. Reviewed health maintenance. Instructedto continue current medications, diet and exercise. Patient agreed with treatmentplan. Follow up as directed.      Electronically signed by Hortensia Castro MD on 7/8/2020 at 11:21 AM

## 2020-08-03 ENCOUNTER — OFFICE VISIT (OUTPATIENT)
Dept: INTERNAL MEDICINE CLINIC | Age: 75
End: 2020-08-03
Payer: COMMERCIAL

## 2020-08-03 VITALS
WEIGHT: 155 LBS | DIASTOLIC BLOOD PRESSURE: 82 MMHG | SYSTOLIC BLOOD PRESSURE: 138 MMHG | BODY MASS INDEX: 26.46 KG/M2 | HEIGHT: 64 IN | HEART RATE: 72 BPM | TEMPERATURE: 98.2 F | RESPIRATION RATE: 16 BRPM

## 2020-08-03 LAB
BASOPHILS ABSOLUTE: 0 /ΜL
BASOPHILS RELATIVE PERCENT: 0.6 %
EOSINOPHILS ABSOLUTE: 0.1 /ΜL
EOSINOPHILS RELATIVE PERCENT: 1.2 %
HCT VFR BLD CALC: 37 % (ref 36–46)
HEMOGLOBIN: 12.5 G/DL (ref 12–16)
LYMPHOCYTES ABSOLUTE: 1.2 /ΜL
LYMPHOCYTES RELATIVE PERCENT: 21 %
MCH RBC QN AUTO: 30.3 PG
MCHC RBC AUTO-ENTMCNC: 33.7 G/DL
MCV RBC AUTO: 90 FL
MONOCYTES ABSOLUTE: 0.4 /ΜL
MONOCYTES RELATIVE PERCENT: 8.2 %
NEUTROPHILS ABSOLUTE: 3.8 /ΜL
NEUTROPHILS RELATIVE PERCENT: 69 %
PDW BLD-RTO: 13.7 %
PLATELET # BLD: 269 K/ΜL
PMV BLD AUTO: 7.7 FL
RBC # BLD: 4.11 10^6/ΜL
WBC # BLD: 5.5 10^3/ML

## 2020-08-03 PROCEDURE — G0439 PPPS, SUBSEQ VISIT: HCPCS | Performed by: INTERNAL MEDICINE

## 2020-08-03 RX ORDER — CLOBETASOL PROPIONATE 0.5 MG/G
CREAM TOPICAL
Qty: 1 TUBE | Refills: 0 | Status: SHIPPED | OUTPATIENT
Start: 2020-08-03

## 2020-08-03 ASSESSMENT — PATIENT HEALTH QUESTIONNAIRE - PHQ9
2. FEELING DOWN, DEPRESSED OR HOPELESS: 0
SUM OF ALL RESPONSES TO PHQ QUESTIONS 1-9: 0
SUM OF ALL RESPONSES TO PHQ9 QUESTIONS 1 & 2: 0
SUM OF ALL RESPONSES TO PHQ QUESTIONS 1-9: 0
1. LITTLE INTEREST OR PLEASURE IN DOING THINGS: 0

## 2020-08-03 NOTE — PATIENT INSTRUCTIONS
Personalized Preventive Plan for Santiago Rutherford - 8/3/2020  Medicare offers a range of preventive health benefits. Some of the tests and screenings are paid in full while other may be subject to a deductible, co-insurance, and/or copay. Some of these benefits include a comprehensive review of your medical history including lifestyle, illnesses that may run in your family, and various assessments and screenings as appropriate. After reviewing your medical record and screening and assessments performed today your provider may have ordered immunizations, labs, imaging, and/or referrals for you. A list of these orders (if applicable) as well as your Preventive Care list are included within your After Visit Summary for your review. Other Preventive Recommendations:    · A preventive eye exam performed by an eye specialist is recommended every 1-2 years to screen for glaucoma; cataracts, macular degeneration, and other eye disorders. · A preventive dental visit is recommended every 6 months. · Try to get at least 150 minutes of exercise per week or 10,000 steps per day on a pedometer . · Order or download the FREE \"Exercise & Physical Activity: Your Everyday Guide\" from [employer]. Call 5-970.225.7343 or search [employer] online. · You need 1908-5767 mg of calcium and 2295-2636 IU of vitamin D per day. It is possible to meet your calcium requirement with diet alone, but a vitamin D supplement is usually necessary to meet this goal.  · When exposed to the sun, use a sunscreen that protects against both UVA and UVB radiation with an SPF of 30 or greater. Reapply every 2 to 3 hours or after sweating, drying off with a towel, or swimming. · Always wear a seat belt when traveling in a car. Always wear a helmet when riding a bicycle or motorcycle.

## 2020-08-03 NOTE — PROGRESS NOTES
Medicare Annual Wellness Visit  Name: Ariel Centeno Date: 8/3/2020   MRN: [id number] Sex: Female   Age: 67 y.o. Ethnicity: Non-/Non    : 1945 Race: Frankie Mann is here for Results (Pt is here for Ct results and dr Mayda Ornelas results also with other questions. Pt didnt not want to do medicare wellness visit.)    Screenings for behavioral, psychosocial and functional/safety risks, and cognitive dysfunction are all negative except as indicated below. These results, as well as other patient data from the [address] form, are documented in Flowsheets linked to this Encounter. Allergies   Allergen Reactions    Lipitor [Atorvastatin]     Tetracaine Hcl     Zanaflex [Tizanidine Hcl] Other (See Comments)     Near syncope     Celebrex [Celecoxib] Rash    Flonase [Fluticasone Propionate] Rash and Other (See Comments)     Blood pressure goes up    Pontocaine [Tetracaine] Rash and Other (See Comments)     Blood pressure goes up         Prior to Visit Medications    Medication Sig Taking? Authorizing Provider   clobetasol (TEMOVATE) 0.05 % cream Apply topically 2 times daily.  Yes Andrew Barragan MD   SYMBICORT 160-4.5 MCG/ACT AERO  Yes Historical Provider, MD   glimepiride (AMARYL) 1 MG tablet  Yes Historical Provider, MD   losartan (COZAAR) 100 MG tablet Take 1 tablet by mouth daily Yes Andrew Barragan MD   rosuvastatin (CRESTOR) 10 MG tablet Take 1 tablet by mouth daily Yes Andrew Barragan MD   amLODIPine (NORVASC) 10 MG tablet Take 1 tablet by mouth daily Yes Andrew Barragan MD   clopidogrel (PLAVIX) 75 MG tablet Take 1 tablet by mouth daily Yes Andrew Barragan MD   metoprolol succinate (TOPROL XL) 50 MG extended release tablet Take 1 tablet by mouth daily Yes Andrew Barragan MD   butalbital-acetaminophen-caffeine (FIORICET, ESGIC) -40 MG per tablet Take 1 tablet by mouth every 6 hours as needed for Headaches Yes Montgomery Burkitt, MD   vitamin E 400 UNIT capsule Take 400 Units by mouth daily Yes Historical Provider, MD   CALCIUM CITRATE PO Take  by mouth daily.  Yes Historical Provider, MD   omeprazole (PRILOSEC) 20 MG delayed release capsule Take 1 capsule by mouth Daily  Patient not taking: Reported on 8/3/2020  Loan Salmeron MD   famotidine (PEPCID) 20 MG tablet Take 1 tablet by mouth 2 times daily  Patient not taking: Reported on 8/3/2020  Yakelin Waterman MD         Past Medical History:   Diagnosis Date    Arthritis     Asthma     AVM (arteriovenous malformation) 1945    Cerebral aneurysm, nonruptured     COPD (chronic obstructive pulmonary disease) ([address]     DDD (degenerative disc disease), lumbar 4/18/2017    Diabetes ([address]     GERD (gastroesophageal reflux disease)     Herpes zoster with other nervous system complications(053.19)     Hypertension     Lumbago     Migraine     Myoclonus     Neck pain     Osteoporosis     Other and unspecified hyperlipidemia     Palpitations     COMPA (renal artery stenosis) ([address] 7/9/2015    Seasonal allergic rhinitis 6/6/2019    Seasonal allergic rhinitis     Stroke ([address] 2018    Type 2 diabetes mellitus with autonomic neuropathy ([address] 9/9/2015    Vision abnormalities     glasses    Vitamin D deficiency        Past Surgical History:   Procedure Laterality Date    COLONOSCOPY  2015    CYST REMOVAL  11/2016    vaginal     DILATION AND CURETTAGE      DILATION AND CURETTAGE  10/5/15    w/ hysteroscopy    DILATION AND CURETTAGE OF UTERUS      FRACTURE SURGERY Left     HYSTEROSCOPY  10/5/15    w/ d&c    UPPER GASTROINTESTINAL ENDOSCOPY           Family History   Problem Relation Age of Onset    Cancer Mother         BRAIN    Cancer Father         PANCREATIC    Cancer Sister         SKIN    Heart Disease Maternal Aunt        CareTeam (Including outside providers/suppliers regularly involved in providing care):   Patient Care Team:  Loan Salmeron MD as PCP - General (Internal Medicine)  Shelbi Sanchez MD as PCP [facility]  Larose Fleischer, DO as Consulting Physician (Obstetrics & Gynecology)    [facility] Readings from Last 3 Encounters:   08/03/20 155 lb (70.3 kg)   07/08/20 158 lb (71.7 kg)   03/11/20 159 lb (72.1 kg)     Vitals:    08/03/20 1344   BP: 138/82   Site: Right Upper Arm   Position: Sitting   Cuff Size: Small Adult   Pulse: 72   Resp: 16   Temp: 98.2 °F (36.8 °C)   Weight: 155 lb (70.3 kg)   Height: 5' 4\" (1.626 m)     Body mass index is 26.61 kg/m². Based upon direct observation of the patient, evaluation of cognition reveals global memory impairment noted. Skin: warm and dry, no rash or erythema  Head: normocephalic and atraumatic  Eyes: pupils equal, round, and reactive to light, extraocular eye movements intact, conjunctivae normal  ENT: tympanic membrane, external ear and ear canal normal bilaterally, nose without deformity, nasal mucosa and turbinates normal without polyps  Neck: supple and non-tender without mass, no thyromegaly or thyroid nodules, no cervical lymphadenopathy  Pulmonary/Chest: clear to auscultation bilaterally- no wheezes, rales or rhonchi, normal air movement, no respiratory distress  Cardiovascular: normal rate, regular rhythm, normal S1 and S2, no murmurs, rubs, clicks, or gallops, distal pulses intact, no carotid bruits  Abdomen: soft, non-tender, non-distended, normal bowel sounds, no masses or organomegaly  Extremities: no cyanosis, clubbing or edema  Musculoskeletal: normal range of motion, no joint swelling, deformity or tenderness  Neurologic: reflexes normal and symmetric, no cranial nerve deficit, gait, coordination and speech normal    Patient's complete Health Risk Assessment and screening values have been reviewed and are found in Flowsheets. The following problems were reviewed today and where indicated follow up appointments were made and/or referrals ordered.     Positive Risk Factor Screenings with Interventions: No Positive Risk Factors identified today. Personalized Preventive Plan   Current Health Maintenance Status  Immunization History   Administered Date(s) Administered    Influenza Vaccine, unspecified formulation 10/20/2004, 10/07/2005    Influenza Virus Vaccine 10/08/2015    Influenza Whole 10/08/2015    Influenza, High Dose (Fluzone 65 yrs and older) 09/27/2017, 09/26/2019    Pneumococcal Conjugate 13-valent (Eswhypr16) 01/18/2016    Pneumococcal Polysaccharide ([id number]) 10/07/2005, 10/11/2013        Health Maintenance   Topic Date Due    DTaP/Tdap/Td vaccine (1 - Tdap) 02/17/1963    Shingles Vaccine (1 of 2) 02/17/1994    Annual Wellness Visit (AWV)  05/29/2019    Flu vaccine (1) 09/01/2020    Lipid screen  01/24/2021    Potassium monitoring  01/24/2021    Creatinine monitoring  01/24/2021    DEXA (modify frequency per FRAX score)  Completed    Pneumococcal 65+ years Vaccine  Completed    Hepatitis A vaccine  Aged Out    Hib vaccine  Aged Out    Meningococcal (ACWY) vaccine  Aged Out     Recommendations for "[employer]" Due: see orders and patient instructions/AVS.  . Recommended screening schedule for the next 5-10 years is provided to the patient in written form: see Patient Instructions/AVS.    Gladis Méndez was seen today for results. Diagnoses and all orders for this visit:    DM type 2, uncontrolled, with renal complications ([facility]  -     CBC Auto Differential; Future    Routine general medical examination at a health care facility    Other orders  -     clobetasol (TEMOVATE) 0.05 % cream; Apply topically 2 times daily.             On vt e worrked about aenmia  Will check cbc  abdo pain ct negative  Uterine thickening seen obgyn   Pt and surg delcined abn bx  No bleeding   Ado pian non specific  If not better or low cbc candy get colonoscpy

## 2020-08-11 RX ORDER — BUDESONIDE AND FORMOTEROL FUMARATE DIHYDRATE 160; 4.5 UG/1; UG/1
AEROSOL RESPIRATORY (INHALATION)
Qty: 10.2 G | Refills: 0 | Status: SHIPPED | OUTPATIENT
Start: 2020-08-11 | End: 2020-09-16

## 2020-09-03 ENCOUNTER — OFFICE VISIT (OUTPATIENT)
Dept: INTERNAL MEDICINE CLINIC | Age: 75
End: 2020-09-03
Payer: COMMERCIAL

## 2020-09-03 VITALS
OXYGEN SATURATION: 97 % | TEMPERATURE: 98.2 F | HEIGHT: 64 IN | WEIGHT: 154 LBS | HEART RATE: 68 BPM | SYSTOLIC BLOOD PRESSURE: 110 MMHG | DIASTOLIC BLOOD PRESSURE: 64 MMHG | BODY MASS INDEX: 26.29 KG/M2

## 2020-09-03 LAB — HBA1C MFR BLD: 6.8 %

## 2020-09-03 PROCEDURE — 99214 OFFICE O/P EST MOD 30 MIN: CPT | Performed by: INTERNAL MEDICINE

## 2020-09-03 PROCEDURE — 83036 HEMOGLOBIN GLYCOSYLATED A1C: CPT | Performed by: INTERNAL MEDICINE

## 2020-09-03 RX ORDER — PRENATAL VIT 91/IRON/FOLIC/DHA 28-975-200
COMBINATION PACKAGE (EA) ORAL
Qty: 1 TUBE | Refills: 2 | Status: SHIPPED | OUTPATIENT
Start: 2020-09-03

## 2020-09-03 ASSESSMENT — ENCOUNTER SYMPTOMS
ABDOMINAL DISTENTION: 0
BLOOD IN STOOL: 0
COUGH: 0
TROUBLE SWALLOWING: 0
EYE PAIN: 0
DIARRHEA: 0
WHEEZING: 0
COLOR CHANGE: 0
EYE DISCHARGE: 0
SHORTNESS OF BREATH: 0

## 2020-09-03 NOTE — PROGRESS NOTES
[address] [address]  Dept: [phone]  Dept Fax: [phone]    Wilfredo Stahl is a 67 y.o. female who presents today for her medical conditions/complaintsas noted below. Wilfredo Stahl is c/o of   Chief Complaint   Patient presents with    Diabetes         HPI:     HTN  Onset more than 2 years ago  lexa mild to mod  Controlled with current po meds  Not associated with headaches or blurry vision  No chest pain  Diabetes   Duration more than 7 years  Modifying factors on Glucophage and other med  Severity uncontrolled sever  Associated signs and symtoms neuropathy/ckd/ CAD. aggravated with sugar diet and better with low sugar diet           Hemoglobin A1C (%)   Date Value   09/03/2020 6.8   05/06/2020 6.8   01/24/2020 6.4             ( goal A1Cis < 7)   Microalb/Crt.  Ratio (mcg/mg creat)   Date Value   06/24/2014 7     LDL Cholesterol (mg/dL)   Date Value   06/24/2014 80     LDL Calculated (mg/dL)   Date Value   01/24/2020 86   12/10/2018 76   09/26/2017 158       (goal LDL is <100)   AST (U/L)   Date Value   01/24/2020 16     ALT (U/L)   Date Value   01/24/2020 15     BUN (mg/dL)   Date Value   01/24/2020 17     BP Readings from Last 3 Encounters:   09/03/20 110/64   08/03/20 138/82   07/08/20 124/70          (goal 120/80)    Past Medical History:   Diagnosis Date    Arthritis     Asthma     AVM (arteriovenous malformation) 1945    Cerebral aneurysm, nonruptured     [facility] [facility]     DDD (degenerative disc disease), lumbar 4/18/2017    [facility]     GERD (gastroesophageal reflux disease)     Herpes zoster with other nervous system complications(053.19)     Hypertension     Lumbago     Migraine     Myoclonus     Neck pain     Osteoporosis     Other and unspecified hyperlipidemia     Palpitations     COMPA (renal artery stenosis) ([facility]) 7/9/2015    Seasonal allergic rhinitis 6/6/2019    Seasonal allergic rhinitis     Stroke [facility]) 2018    Type 2 diabetes mellitus with autonomic neuropathy ([facility] [address] 2015    Vision abnormalities     glasses    Vitamin D deficiency       Past Surgical History:   Procedure Laterality Date    COLONOSCOPY  2015    CYST REMOVAL  2016    vaginal     DILATION AND CURETTAGE      DILATION AND CURETTAGE  10/5/15    w/ hysteroscopy    DILATION AND CURETTAGE OF UTERUS      FRACTURE SURGERY Left     HYSTEROSCOPY  10/5/15    w/ d&c    UPPER GASTROINTESTINAL ENDOSCOPY         Family History   Problem Relation Age of Onset    Cancer Mother         BRAIN    Cancer Father         PANCREATIC    Cancer Sister         SKIN    Heart Disease Maternal Aunt        Social History     Tobacco Use    Smoking status: Former Smoker     Packs/day: 0.25     Years: 30.00     Pack years: 7.50     Types: Cigarettes     Start date: 1955     Last attempt to quit: 1990     Years since quittin.0    Smokeless tobacco: Never Used   Substance Use Topics    Alcohol use: No     Alcohol/week: 0.0 standard drinks      Current Outpatient Medications   Medication Sig Dispense Refill    terbinafine (ATHLETES FOOT) 1 % cream Apply topically 2 times daily. 1 Tube 2    SYMBICORT 160-4.5 MCG/ACT AERO INHALE 2 PUFFS INTO THE LUNGS 2 TIMES DAILY. 10.2 g 0    clobetasol (TEMOVATE) 0.05 % cream Apply topically 2 times daily.  1 Tube 0    glimepiride (AMARYL) 1 MG tablet       losartan (COZAAR) 100 MG tablet Take 1 tablet by mouth daily 90 tablet 3    rosuvastatin (CRESTOR) 10 MG tablet Take 1 tablet by mouth daily 90 tablet 3    amLODIPine (NORVASC) 10 MG tablet Take 1 tablet by mouth daily 90 tablet 3    clopidogrel (PLAVIX) 75 MG tablet Take 1 tablet by mouth daily 90 tablet 3    metoprolol succinate (TOPROL XL) 50 MG extended release tablet Take 1 tablet by mouth daily 90 tablet 3    omeprazole (PRILOSEC) 20 MG delayed release capsule Take 1 capsule by mouth Daily 90 capsule 3    famotidine (PEPCID) 20 MG tablet Take 1 tablet by mouth 2 times daily 60 tablet 3    butalbital-acetaminophen-caffeine (FIORICET, ESGIC) -40 MG per tablet Take 1 tablet by mouth every 6 hours as needed for Headaches 30 tablet 1    vitamin E 400 UNIT capsule Take 400 Units by mouth daily      CALCIUM CITRATE PO Take  by mouth daily. No current facility-administered medications for this visit. Allergies   Allergen Reactions    Lipitor [Atorvastatin]     Tetracaine Hcl     Zanaflex [Tizanidine Hcl] Other (See Comments)     Near syncope     Celebrex [Celecoxib] Rash    Flonase [Fluticasone Propionate] Rash and Other (See Comments)     Blood pressure goes up    Pontocaine [Tetracaine] Rash and Other (See Comments)     Blood pressure goes up       Health Maintenance   Topic Date Due    DTaP/Tdap/Td vaccine (1 - Tdap) 02/17/1963    Shingles Vaccine (1 of 2) 02/17/1994    Flu vaccine (1) 09/01/2020    Lipid screen  01/24/2021    Potassium monitoring  01/24/2021    Creatinine monitoring  01/24/2021    Annual Wellness Visit (AWV)  08/04/2021    DEXA (modify frequency per FRAX score)  Completed    Pneumococcal 65+ years Vaccine  Completed    Hepatitis A vaccine  Aged Out    Hib vaccine  Aged Out    Meningococcal (ACWY) vaccine  Aged Out       Subjective:     Review of Systems   Constitutional: Negative for appetite change, diaphoresis and fatigue. HENT: Negative for ear discharge and trouble swallowing. Eyes: Negative for pain and discharge. Respiratory: Negative for cough, shortness of breath and wheezing. Cardiovascular: Negative for chest pain and palpitations. Gastrointestinal: Negative for abdominal distention, blood in stool and diarrhea. Endocrine: Negative for polydipsia and polyphagia. Genitourinary: Negative for difficulty urinating and frequency. Musculoskeletal: Negative for gait problem, myalgias and neck pain. Skin: Negative for color change and rash. Allergic/Immunologic: Negative for environmental allergies and food allergies. Neurological: Negative for dizziness and headaches. Hematological: Negative for adenopathy. Does not bruise/bleed easily. Psychiatric/Behavioral: Negative for behavioral problems and sleep disturbance. Objective:     Physical Exam  Constitutional:       Appearance: She is well-developed. She is not diaphoretic. HENT:      Head: Normocephalic and atraumatic. Eyes:      General:         Right eye: No discharge. Left eye: No discharge. Extraocular Movements:      Right eye: Normal extraocular motion. Left eye: Normal extraocular motion. Conjunctiva/sclera: Conjunctivae normal.      Right eye: Right conjunctiva is not injected. Left eye: Left conjunctiva is not injected. Neck:      Musculoskeletal: Normal range of motion and neck supple. No edema or erythema. Thyroid: No thyroid mass or thyromegaly. Vascular: No JVD. Comments: Skin rash arondneck  Cardiovascular:      Rate and Rhythm: Normal rate and regular rhythm. Heart sounds: No murmur. No friction rub. Pulmonary:      Effort: Pulmonary effort is normal. No tachypnea, bradypnea, accessory muscle usage or respiratory distress. Breath sounds: Normal breath sounds. No wheezing or rales. Abdominal:      General: Bowel sounds are normal. There is no distension. Palpations: Abdomen is soft. Tenderness: There is no abdominal tenderness. There is no rebound. Musculoskeletal: Normal range of motion. General: No tenderness. Lymphadenopathy:      Head:      Right side of head: No submental or submandibular adenopathy. Left side of head: No submental or submandibular adenopathy. Cervical: No cervical adenopathy. Skin:     General: Skin is warm. Coloration: Skin is not pale. Findings: No bruising, ecchymosis or rash.    Neurological:      Mental Status: She is alert and oriented to person, place, and time. Cranial Nerves: No cranial nerve deficit. Sensory: No sensory deficit. Motor: No atrophy or abnormal muscle tone. Coordination: Coordination normal.   Psychiatric:         Mood and Affect: Mood is not anxious. Affect is not angry. Speech: Speech is not slurred. Behavior: Behavior normal. Behavior is not aggressive. Thought Content: Thought content does not include homicidal ideation. Cognition and Memory: Memory is not impaired. /64   Pulse 68   Temp 98.2 °F (36.8 °C)   Ht 5' 4\" (1.626 m)   Wt 154 lb (69.9 kg)   LMP 02/01/1996 (Approximate)   SpO2 97%   BMI 26.43 kg/m²     Assessment:       Diagnosis Orders   1. DM type 2, uncontrolled, with renal complications (HCC)  POCT glycosylated hemoglobin (Hb A1C)   2. Essential hypertension     3. Chronic obstructive pulmonary disease with acute exacerbation ([facility] [address]     4. Vitamin D deficiency     5. Chronic bilateral low back pain without sciatica     6. Rash     7. Skin disease, fungal               Plan:      Return in about 2 months (around 11/3/2020). Orders Placed This Encounter   Procedures    POCT glycosylated hemoglobin (Hb A1C)     Orders Placed This Encounter   Medications    terbinafine (ATHLETES FOOT) 1 % cream     Sig: Apply topically 2 times daily. Dispense:  1 Tube     Refill:  2    covid negative  Ct abdo negaitve all discused withpt  Rash neck adveid to see dderm  Pt dont need ref   She has one she candy richard to make apt     Patient given educational materials - see patient instructions. Discussed use, benefit, and side effects of prescribed medications. All patientquestions answered. Pt voiced understanding. Reviewed health maintenance. Instructedto continue current medications, diet and exercise. Patient agreed with treatmentplan. Follow up as directed.      Electronically signed by Andrew Barragan MD on 9/3/2020 at 2:19 PMVisit Information    Have you changed or started any medications since your last visit including any over-the-counter medicines, vitamins, or herbal medicines? no   Are you having any side effects from any of your medications? -  no  Have you stopped taking any of your medications? Is so, why? -  no    Have you seen any other physician or provider since your last visit? No  Have you had any other diagnostic tests since your last visit? No  Have you been seen in the emergency room and/or had an admission to a hospital since we last saw you? No  Have you had your routine dental cleaning in the past 6 months? no    Have you activated your [employer] account? If not, what are your barriers?  No:      Patient Care Team:  Remedios Hassan MD as PCP - General (Internal Medicine)  Remedios Hassan MD as PCP - [facility] Provider  Travis Cobb DO as Consulting Physician (Obstetrics & Gynecology)    Medical History Review  Past Medical, Family, and Social History reviewed and does not contribute to the patient presenting condition    Health Maintenance   Topic Date Due    DTaP/Tdap/Td vaccine (1 - Tdap) 02/17/1963    Shingles Vaccine (1 of 2) 02/17/1994    Flu vaccine (1) 09/01/2020    Lipid screen  01/24/2021    Potassium monitoring  01/24/2021    Creatinine monitoring  01/24/2021    Annual Wellness Visit (AWV)  08/04/2021    DEXA (modify frequency per FRAX score)  Completed    Pneumococcal 65+ years Vaccine  Completed    Hepatitis A vaccine  Aged Out    Hib vaccine  Aged Out    Meningococcal (ACWY) vaccine  Aged Out

## 2020-09-16 RX ORDER — BUDESONIDE AND FORMOTEROL FUMARATE DIHYDRATE 160; 4.5 UG/1; UG/1
AEROSOL RESPIRATORY (INHALATION)
Qty: 10.2 G | Refills: 0 | Status: SHIPPED | OUTPATIENT
Start: 2020-09-16 | End: 2020-11-03

## 2020-10-13 ENCOUNTER — TELEPHONE (OUTPATIENT)
Dept: INTERNAL MEDICINE CLINIC | Age: 75
End: 2020-10-13

## 2020-10-13 NOTE — TELEPHONE ENCOUNTER
Patient called at last appointment  09/03/2020 she pt had a rash on her neck chest and face. Pt tried all the creams and would like to be referred to a dermatologist he talked about at her appointment.         -PT needs a dermatologist that takes [facility]

## 2020-10-19 RX ORDER — GLIMEPIRIDE 1 MG/1
TABLET ORAL
Qty: 90 TABLET | Refills: 0 | Status: SHIPPED | OUTPATIENT
Start: 2020-10-19 | End: 2020-11-05

## 2020-10-31 RX ORDER — BUDESONIDE AND FORMOTEROL FUMARATE DIHYDRATE 160; 4.5 UG/1; UG/1
AEROSOL RESPIRATORY (INHALATION)
Qty: 10.2 G | Refills: 0 | Status: CANCELLED | OUTPATIENT
Start: 2020-10-31

## 2020-11-03 ENCOUNTER — OFFICE VISIT (OUTPATIENT)
Dept: INTERNAL MEDICINE CLINIC | Age: 75
End: 2020-11-03
Payer: COMMERCIAL

## 2020-11-03 VITALS
TEMPERATURE: 96.6 F | DIASTOLIC BLOOD PRESSURE: 74 MMHG | HEIGHT: 64 IN | OXYGEN SATURATION: 98 % | WEIGHT: 153 LBS | SYSTOLIC BLOOD PRESSURE: 132 MMHG | BODY MASS INDEX: 26.12 KG/M2 | HEART RATE: 64 BPM

## 2020-11-03 PROBLEM — I16.1 HYPERTENSIVE EMERGENCY: Status: RESOLVED | Noted: 2018-06-13 | Resolved: 2020-11-03

## 2020-11-03 PROCEDURE — 99214 OFFICE O/P EST MOD 30 MIN: CPT | Performed by: INTERNAL MEDICINE

## 2020-11-03 RX ORDER — BUDESONIDE AND FORMOTEROL FUMARATE DIHYDRATE 160; 4.5 UG/1; UG/1
AEROSOL RESPIRATORY (INHALATION)
Qty: 10.2 G | Refills: 0 | Status: SHIPPED | OUTPATIENT
Start: 2020-11-03

## 2020-11-03 ASSESSMENT — ENCOUNTER SYMPTOMS
COUGH: 0
WHEEZING: 0
BACK PAIN: 1
EYE PAIN: 0
BLOOD IN STOOL: 0
COLOR CHANGE: 0
SHORTNESS OF BREATH: 0
EYE DISCHARGE: 0
ABDOMINAL DISTENTION: 0
DIARRHEA: 0
TROUBLE SWALLOWING: 0

## 2020-11-03 NOTE — PROGRESS NOTES
Visit Information    Have you changed or started any medications since your last visit including any over-the-counter medicines, vitamins, or herbal medicines? no   Are you having any side effects from any of your medications? -  no  Have you stopped taking any of your medications? Is so, why? -  no    Have you seen any other physician or provider since your last visit? No  Have you had any other diagnostic tests since your last visit? No  Have you been seen in the emergency room and/or had an admission to a hospital since we last saw you? No  Have you had your routine dental cleaning in the past 6 months? yes -     Have you activated your [employer] account? If not, what are your barriers?  No:      Patient Care Team:  Ernst Simmons MD as PCP - General (Internal Medicine)  Ernst Simmons MD as PCP - [address] [place] [address]  [facility] Provider  Bhavani Balbuena DO as Consulting Physician (Obstetrics & Gynecology)    Medical History Review  Past Medical, Family, and Social History reviewed and does not contribute to the patient presenting condition    Health Maintenance   Topic Date Due    DTaP/Tdap/Td vaccine (1 - Tdap) 02/17/1963    Shingles Vaccine (1 of 2) 02/17/1994    Lipid screen  01/24/2021    Potassium monitoring  01/24/2021    Creatinine monitoring  01/24/2021    Annual Wellness Visit (AWV)  08/04/2021    DEXA (modify frequency per FRAX score)  Completed    Flu vaccine  Completed    Pneumococcal 65+ years Vaccine  Completed    Hepatitis A vaccine  Aged Out    Hib vaccine  Aged Out    Meningococcal (ACWY) vaccine  Aged Out

## 2020-11-03 NOTE — PROGRESS NOTES
[address] [address]  Dept: [phone]  Dept Fax: [phone]    Momo Dykes is a 67 y.o. female who presents today for her medical conditions/complaintsas noted below. Momo Dykes is c/o of   Chief Complaint   Patient presents with    Hypertension    Diabetes     9.3.20 AIC was 6.8         HPI:     HTN  Onset more than 2 years ago  lexa mild to mod  Controlled with current po meds  Not associated with headaches or blurry vision  No chest pain  Diabetes   Duration more than 7 years  Modifying factors on Glucophage and other med  Severity uncontrolled sever  Associated signs and symtoms neuropathy/ckd/ CAD. aggravated with sugar diet and better with low sugar diet           Hemoglobin A1C (%)   Date Value   09/03/2020 6.8   05/06/2020 6.8   01/24/2020 6.4             ( goal A1Cis < 7)   Microalb/Crt.  Ratio (mcg/mg creat)   Date Value   06/24/2014 7     LDL Cholesterol (mg/dL)   Date Value   06/24/2014 80     LDL Calculated (mg/dL)   Date Value   01/24/2020 86   12/10/2018 76   09/26/2017 158       (goal LDL is <100)   AST (U/L)   Date Value   01/24/2020 16     ALT (U/L)   Date Value   01/24/2020 15     BUN (mg/dL)   Date Value   01/24/2020 17     BP Readings from Last 3 Encounters:   11/03/20 132/74   09/03/20 110/64   08/03/20 138/82          (goal 120/80)    Past Medical History:   Diagnosis Date    Arthritis     Asthma     AVM (arteriovenous malformation) 1945    Cerebral aneurysm, nonruptured     COPD (chronic obstructive pulmonary disease) ([facility])     DDD (degenerative disc disease), lumbar 4/18/2017    Diabetes ([facility])     GERD (gastroesophageal reflux disease)     Herpes zoster with other nervous system complications(583.19)     Hypertension     Lumbago     Migraine     Myoclonus     Neck pain     Osteoporosis     Other and unspecified hyperlipidemia     Palpitations     COMPA (renal artery stenosis) ([facility] [address] 7/9/2015    Seasonal allergic rhinitis 2019    Seasonal allergic rhinitis     Stroke [facility]) 2018    [facility]    Vision abnormalities     glasses    Vitamin D deficiency       Past Surgical History:   Procedure Laterality Date    COLONOSCOPY  2015    CYST REMOVAL  2016    vaginal     DILATION AND CURETTAGE      DILATION AND CURETTAGE  10/5/15    w/ hysteroscopy    DILATION AND CURETTAGE OF UTERUS      FRACTURE SURGERY Left     HYSTEROSCOPY  10/5/15    w/ d&c    UPPER GASTROINTESTINAL ENDOSCOPY         Family History   Problem Relation Age of Onset    Cancer Mother         BRAIN    Cancer Father         PANCREATIC    Cancer Sister         SKIN    Heart Disease Maternal Aunt        Social History     Tobacco Use    Smoking status: Former Smoker     Packs/day: 0.25     Years: 30.00     Pack years: 7.50     Types: Cigarettes     Start date: 1955     Last attempt to quit: 1990     Years since quittin.2    Smokeless tobacco: Never Used   Substance Use Topics    Alcohol use: No     Alcohol/week: 0.0 standard drinks      Current Outpatient Medications   Medication Sig Dispense Refill    glimepiride (AMARYL) 1 MG tablet TAKE ONE TABLET BY MOUTH IN THE MORNING  BEFORE BREAKFAST  90 tablet 0    terbinafine (ATHLETES FOOT) 1 % cream Apply topically 2 times daily. 1 Tube 2    clobetasol (TEMOVATE) 0.05 % cream Apply topically 2 times daily.  1 Tube 0    losartan (COZAAR) 100 MG tablet Take 1 tablet by mouth daily 90 tablet 3    rosuvastatin (CRESTOR) 10 MG tablet Take 1 tablet by mouth daily 90 tablet 3    amLODIPine (NORVASC) 10 MG tablet Take 1 tablet by mouth daily 90 tablet 3    clopidogrel (PLAVIX) 75 MG tablet Take 1 tablet by mouth daily 90 tablet 3    metoprolol succinate (TOPROL XL) 50 MG extended release tablet Take 1 tablet by mouth daily 90 tablet 3    omeprazole (PRILOSEC) 20 MG delayed release capsule Take 1 capsule by mouth Daily 90 capsule 3    famotidine (PEPCID) 20 MG tablet Take 1 tablet by mouth 2 times daily 60 tablet 3    butalbital-acetaminophen-caffeine (FIORICET, ESGIC) -40 MG per tablet Take 1 tablet by mouth every 6 hours as needed for Headaches 30 tablet 1    vitamin E 400 UNIT capsule Take 400 Units by mouth daily      CALCIUM CITRATE PO Take  by mouth daily.  SYMBICORT 160-4.5 MCG/ACT AERO INHALE 2 PUFFS INTO THE LUNGS 2 TIMES DAILY 10.2 g 0     No current facility-administered medications for this visit. Allergies   Allergen Reactions    Lipitor [Atorvastatin]     Tetracaine Hcl     Zanaflex [Tizanidine Hcl] Other (See Comments)     Near syncope     Celebrex [Celecoxib] Rash    Flonase [Fluticasone Propionate] Rash and Other (See Comments)     Blood pressure goes up    Pontocaine [Tetracaine] Rash and Other (See Comments)     Blood pressure goes up       Health Maintenance   Topic Date Due    DTaP/Tdap/Td vaccine (1 - Tdap) 02/17/1963    Shingles Vaccine (1 of 2) 02/17/1994    Lipid screen  01/24/2021    Potassium monitoring  01/24/2021    Creatinine monitoring  01/24/2021    Annual Wellness Visit (AWV)  08/04/2021    DEXA (modify frequency per FRAX score)  Completed    Flu vaccine  Completed    Pneumococcal 65+ years Vaccine  Completed    Hepatitis A vaccine  Aged Out    Hib vaccine  Aged Out    Meningococcal (ACWY) vaccine  Aged Out       Subjective:     Review of Systems   Constitutional: Negative for appetite change, diaphoresis and fatigue. HENT: Negative for ear discharge and trouble swallowing. Eyes: Negative for pain and discharge. Respiratory: Negative for cough, shortness of breath and wheezing. Cardiovascular: Negative for chest pain and palpitations. Gastrointestinal: Negative for abdominal distention, blood in stool and diarrhea. Endocrine: Negative for polydipsia and polyphagia. Genitourinary: Negative for difficulty urinating and frequency. given educational materials - see patient instructions. Discussed use, benefit, and side effects of prescribed medications. All patientquestions answered. Pt voiced understanding. Reviewed health maintenance. Instructedto continue current medications, diet and exercise. Patient agreed with treatmentplan. Follow up as directed.      Electronically signed by Natalie Geronimo MD on 11/3/2020 at 1:46 PM

## 2020-11-05 RX ORDER — GLIMEPIRIDE 1 MG/1
TABLET ORAL
Qty: 90 TABLET | Refills: 0 | Status: SHIPPED | OUTPATIENT
Start: 2020-11-05

## 2024-07-11 ENCOUNTER — HOSPITAL ENCOUNTER (EMERGENCY)
Age: 79
Discharge: HOME | End: 2024-07-12
Attending: STUDENT IN AN ORGANIZED HEALTH CARE EDUCATION/TRAINING PROGRAM
Payer: COMMERCIAL

## 2024-07-11 VITALS
OXYGEN SATURATION: 100 % | TEMPERATURE: 97.3 F | SYSTOLIC BLOOD PRESSURE: 117 MMHG | HEART RATE: 83 BPM | BODY MASS INDEX: 24.75 KG/M2 | HEIGHT: 64 IN | DIASTOLIC BLOOD PRESSURE: 79 MMHG | RESPIRATION RATE: 17 BRPM | WEIGHT: 145 LBS

## 2024-07-11 DIAGNOSIS — F32.A DEPRESSION, UNSPECIFIED DEPRESSION TYPE: Primary | ICD-10-CM

## 2024-07-11 LAB
ALBUMIN SERPL-MCNC: 4.6 G/DL (ref 3.5–5.2)
ALP SERPL-CCNC: 105 U/L (ref 35–104)
ALT SERPL-CCNC: 19 U/L (ref 5–33)
AMPHET UR QL SCN: NEGATIVE
ANION GAP SERPL CALCULATED.3IONS-SCNC: 17 MMOL/L (ref 9–17)
AST SERPL-CCNC: 25 U/L
BACTERIA URNS QL MICRO: ABNORMAL
BARBITURATES UR QL SCN: NEGATIVE
BASOPHILS # BLD: 0 K/UL (ref 0–0.2)
BASOPHILS NFR BLD: 1 % (ref 0–2)
BENZODIAZ UR QL: NEGATIVE
BILIRUB SERPL-MCNC: 0.5 MG/DL (ref 0.3–1.2)
BILIRUB UR QL STRIP: NEGATIVE
BUN SERPL-MCNC: 18 MG/DL (ref 8–23)
CALCIUM SERPL-MCNC: 9.5 MG/DL (ref 8.6–10.4)
CANNABINOIDS UR QL SCN: NEGATIVE
CASTS #/AREA URNS LPF: ABNORMAL /LPF
CHLORIDE SERPL-SCNC: 104 MMOL/L (ref 98–107)
CLARITY UR: CLEAR
CO2 SERPL-SCNC: 20 MMOL/L (ref 20–31)
COCAINE UR QL SCN: NEGATIVE
COLOR UR: YELLOW
CREAT SERPL-MCNC: 0.9 MG/DL (ref 0.5–0.9)
EOSINOPHIL # BLD: 0.1 K/UL (ref 0–0.4)
EOSINOPHILS RELATIVE PERCENT: 1 % (ref 0–4)
EPI CELLS #/AREA URNS HPF: ABNORMAL /HPF
ERYTHROCYTE [DISTWIDTH] IN BLOOD BY AUTOMATED COUNT: 14.4 % (ref 11.5–14.9)
ETHANOL PERCENT: <0.01 %
ETHANOLAMINE SERPL-MCNC: <10 MG/DL (ref 0–0.08)
FENTANYL UR QL: NEGATIVE
GFR, ESTIMATED: 65 ML/MIN/1.73M2
GLUCOSE SERPL-MCNC: 136 MG/DL (ref 70–99)
GLUCOSE UR STRIP-MCNC: NEGATIVE MG/DL
HCT VFR BLD AUTO: 38.5 % (ref 36–46)
HGB BLD-MCNC: 12.8 G/DL (ref 12–16)
HGB UR QL STRIP.AUTO: NEGATIVE
KETONES UR STRIP-MCNC: ABNORMAL MG/DL
LEUKOCYTE ESTERASE UR QL STRIP: ABNORMAL
LYMPHOCYTES NFR BLD: 1.4 K/UL (ref 1–4.8)
LYMPHOCYTES RELATIVE PERCENT: 23 % (ref 24–44)
MCH RBC QN AUTO: 29.7 PG (ref 26–34)
MCHC RBC AUTO-ENTMCNC: 33.3 G/DL (ref 31–37)
MCV RBC AUTO: 89.1 FL (ref 80–100)
METHADONE UR QL: NEGATIVE
MONOCYTES NFR BLD: 0.6 K/UL (ref 0.1–1.3)
MONOCYTES NFR BLD: 10 % (ref 1–7)
NEUTROPHILS NFR BLD: 65 % (ref 36–66)
NEUTS SEG NFR BLD: 4.1 K/UL (ref 1.3–9.1)
NITRITE UR QL STRIP: NEGATIVE
OPIATES UR QL SCN: NEGATIVE
OXYCODONE UR QL SCN: NEGATIVE
PCP UR QL SCN: NEGATIVE
PH UR STRIP: 5 [PH] (ref 5–8)
PLATELET # BLD AUTO: 307 K/UL (ref 150–450)
PMV BLD AUTO: 7.7 FL (ref 6–12)
POTASSIUM SERPL-SCNC: 4.1 MMOL/L (ref 3.7–5.3)
PROT SERPL-MCNC: 7.6 G/DL (ref 6.4–8.3)
PROT UR STRIP-MCNC: NEGATIVE MG/DL
RBC # BLD AUTO: 4.32 M/UL (ref 4–5.2)
RBC #/AREA URNS HPF: ABNORMAL /HPF
SODIUM SERPL-SCNC: 141 MMOL/L (ref 135–144)
SP GR UR STRIP: 1.02 (ref 1–1.03)
TEST INFORMATION: NORMAL
UROBILINOGEN UR STRIP-ACNC: NORMAL EU/DL (ref 0–1)
WBC #/AREA URNS HPF: ABNORMAL /HPF
WBC OTHER # BLD: 6.3 K/UL (ref 3.5–11)

## 2024-07-11 PROCEDURE — 6370000000 HC RX 637 (ALT 250 FOR IP)

## 2024-07-11 PROCEDURE — G0480 DRUG TEST DEF 1-7 CLASSES: HCPCS

## 2024-07-11 PROCEDURE — 85025 COMPLETE CBC W/AUTO DIFF WBC: CPT

## 2024-07-11 PROCEDURE — 80307 DRUG TEST PRSMV CHEM ANLYZR: CPT

## 2024-07-11 PROCEDURE — 99285 EMERGENCY DEPT VISIT HI MDM: CPT

## 2024-07-11 PROCEDURE — 80053 COMPREHEN METABOLIC PANEL: CPT

## 2024-07-11 PROCEDURE — 36415 COLL VENOUS BLD VENIPUNCTURE: CPT

## 2024-07-11 PROCEDURE — 81001 URINALYSIS AUTO W/SCOPE: CPT

## 2024-07-11 RX ORDER — HYDROXYZINE HYDROCHLORIDE 10 MG/1
10 TABLET, FILM COATED ORAL ONCE
Status: COMPLETED | OUTPATIENT
Start: 2024-07-11 | End: 2024-07-11

## 2024-07-11 RX ADMIN — HYDROXYZINE HYDROCHLORIDE 10 MG: 10 TABLET ORAL at 20:02

## 2024-07-11 ASSESSMENT — PAIN DESCRIPTION - LOCATION: LOCATION: GENERALIZED

## 2024-07-11 ASSESSMENT — LIFESTYLE VARIABLES
HOW OFTEN DO YOU HAVE A DRINK CONTAINING ALCOHOL: NEVER
HOW MANY STANDARD DRINKS CONTAINING ALCOHOL DO YOU HAVE ON A TYPICAL DAY: PATIENT DOES NOT DRINK

## 2024-07-11 ASSESSMENT — PAIN SCALES - GENERAL: PAINLEVEL_OUTOF10: 5

## 2024-07-11 NOTE — ED NOTES
Mode of arrival (squad #, walk in, police, etc) : walk-in        Chief complaint(s): altered mental status, suicidal ideations        Arrival Note (brief scenario, treatment PTA, etc).: patient was in the parking lot walking around asking if someone could call the . Patient is alert and oriented but tearful and states she feels like she's a burden to her family. Her  is sick and she feels like her family doesn't want her.

## 2024-07-11 NOTE — ED PROVIDER NOTES
[facility] ED  Emergency Department Encounter  Emergency Medicine Resident     Pt Name: Marisol Spangler  MRN: [id number]  Birthdate 1945  Date of evaluation: 7/11/24  PCP:  Denis Flores MD    CHIEF COMPLAINT       Chief Complaint   Patient presents with    Altered Mental Status       HISTORY OF PRESENT ILLNESS  (Location/Symptom, Timing/Onset, Context/Setting, Quality, Duration, Modifying Factors, Severity.)      Marisol Spangler is a 79 y.o. female who  presents after leaving the house due to being tired of being cold crazy and caring for her  and came to the hospital due to heat as well as frustration with her situation.  Patient denying any homicidal or suicidal ideation at this time.  Patient is alert and oriented, answering questions appropriately.  Feels safe at home.    Patient denies any fever, cough, congestion, chest pain, shortness of breath, abdominal pain, nausea, vomiting, urinary or bowel complaints.  PAST MEDICAL / SURGICAL / SOCIAL / FAMILY HISTORY     PMH:    Past Medical History:   Diagnosis Date    Arthritis     Asthma     AVM (arteriovenous malformation) 1945    Cerebral aneurysm, nonruptured     COPD (chronic obstructive pulmonary disease) ([facility])     DDD (degenerative disc disease), lumbar 4/18/2017    Diabetes ([facility])     GERD (gastroesophageal reflux disease)     Herpes zoster with other nervous system complications(053.19)     Hypertension     Lumbago     Migraine     Myoclonus     Neck pain     Osteoporosis     Other and unspecified hyperlipidemia     Palpitations     COMPA (renal artery stenosis) ([facility]) 7/9/2015    Seasonal allergic rhinitis 6/6/2019    Seasonal allergic rhinitis     Stroke ([facility]) 2018    Type 2 diabetes mellitus with autonomic neuropathy ([facility]) 9/9/2015    Vision abnormalities     glasses    Vitamin D deficiency      Surgical History:   Past Surgical History:   Procedure Laterality Date    COLONOSCOPY  2015    CYST REMOVAL  11/2016    vaginal     DILATION AND  well-severe distress  HENT: Normocephalic, atraumatic   Eyes: Pupils equal round responsive to light Conj nl  Neck: trachea midline, no jvd  Cardiovascular:  No significant murmurs distal heart tones  Pulmonary/Chest Wall: diminished, clear to auscultate bilaterally without wheezes, rhonchi, rales  Abdomen: Soft,  non-distended  no pulsatile mass  Musculoskeletal: no visible injuries or edema  Neurological: Normal neurological exam  Skin: cool and pale    DIFFERENTIAL  DIAGNOSIS     PLAN (LABS / IMAGING / EKG):  Orders Placed This Encounter   Procedures    CBC with Auto Differential    CMP    ETOH    Urinalysis with Reflex to Culture    Urine Drug Screen    Microscopic Urinalysis       MEDICATIONS ORDERED:  Orders Placed This Encounter   Medications    hydrOXYzine HCl (ATARAX) tablet 10 mg           DIAGNOSTIC RESULTS / EMERGENCY DEPARTMENT COURSE      LABS:  Results for orders placed or performed during the hospital encounter of 07/11/24   CBC with Auto Differential   Result Value Ref Range    WBC 6.3 3.5 - 11.0 k/uL    RBC 4.32 4.0 - 5.2 m/uL    Hemoglobin 12.8 12.0 - 16.0 g/dL    Hematocrit 38.5 36 - 46 %    MCV 89.1 80 - 100 fL    MCH 29.7 26 - 34 pg    MCHC 33.3 31 - 37 g/dL    RDW 14.4 11.5 - 14.9 %    Platelets 307 150 - 450 k/uL    MPV 7.7 6.0 - 12.0 fL    Neutrophils % 65 36 - 66 %    Lymphocytes % 23 (L) 24 - 44 %    Monocytes % 10 (H) 1 - 7 %    Eosinophils % 1 0 - 4 %    Basophils % 1 0 - 2 %    Neutrophils Absolute 4.10 1.3 - 9.1 k/uL    Lymphocytes Absolute 1.40 1.0 - 4.8 k/uL    Monocytes Absolute 0.60 0.1 - 1.3 k/uL    Eosinophils Absolute 0.10 0.0 - 0.4 k/uL    Basophils Absolute 0.00 0.0 - 0.2 k/uL   CMP   Result Value Ref Range    Sodium 141 135 - 144 mmol/L    Potassium 4.1 3.7 - 5.3 mmol/L    Chloride 104 98 - 107 mmol/L    CO2 20 20 - 31 mmol/L    Anion Gap 17 9 - 17 mmol/L    Glucose 136 (H) 70 - 99 mg/dL    BUN 18 8 - 23 mg/dL    Creatinine 0.9 0.5 - 0.9 mg/dL    Est, Glom Filt Rate 65 >60
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
## 2024-07-12 NOTE — ED NOTES
Writer spoke with patient's family who were verbally berating staff in the waiting room. Family upset and demanding to know the plans of care regarding the pt. Family was informed that the SW was on phone currently working on arranging placement and plans for their mother. Family continued to demand to know when she and where she would be going. Family again informed that our SW was on the phone working on it at the present moment. Family left note with writer with family contact info and left. Daughter would like to be notified first of when and where her mother is going no matter what time.    Amy, daughter: [phone]  Antonio, : [phone]  [facility] [phone]
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
## 2024-07-12 NOTE — ED NOTES
Patient spoke with [employer] completing intake questionnaire.    Per patient's insurance ([employer]) patient does not meet criteria for them to cover an inpatient stay through [facility].  This writer was unable to find another inpatient facility that her insurance will cover.  ED physician updated.

## 2024-07-12 NOTE — ED NOTES
Writer updated family on insurance denial for inpatient treatment.  Writer explained that we did have some concerns were identified and that we would definitely encourage the patient to link with ongoing mental health treatment.  Per family, they are willing to assist patient in linking with ongoing care and they are willing to assist with transport home from this ED.    ED physician updated.  Patient provided with outpatient resources.